# Patient Record
Sex: MALE | Race: WHITE | NOT HISPANIC OR LATINO | URBAN - METROPOLITAN AREA
[De-identification: names, ages, dates, MRNs, and addresses within clinical notes are randomized per-mention and may not be internally consistent; named-entity substitution may affect disease eponyms.]

---

## 2020-02-05 ENCOUNTER — OUTPATIENT (OUTPATIENT)
Dept: OUTPATIENT SERVICES | Facility: HOSPITAL | Age: 70
LOS: 1 days | End: 2020-02-05
Payer: MEDICARE

## 2020-02-05 VITALS
HEART RATE: 68 BPM | TEMPERATURE: 98 F | WEIGHT: 199.08 LBS | OXYGEN SATURATION: 98 % | RESPIRATION RATE: 16 BRPM | DIASTOLIC BLOOD PRESSURE: 87 MMHG | SYSTOLIC BLOOD PRESSURE: 144 MMHG | HEIGHT: 69 IN

## 2020-02-05 DIAGNOSIS — D13.1 BENIGN NEOPLASM OF STOMACH: ICD-10-CM

## 2020-02-05 DIAGNOSIS — Z98.890 OTHER SPECIFIED POSTPROCEDURAL STATES: Chronic | ICD-10-CM

## 2020-02-05 DIAGNOSIS — Z90.81 ACQUIRED ABSENCE OF SPLEEN: Chronic | ICD-10-CM

## 2020-02-05 DIAGNOSIS — I10 ESSENTIAL (PRIMARY) HYPERTENSION: ICD-10-CM

## 2020-02-05 DIAGNOSIS — Z01.818 ENCOUNTER FOR OTHER PREPROCEDURAL EXAMINATION: ICD-10-CM

## 2020-02-05 PROCEDURE — G0463: CPT

## 2020-02-05 RX ORDER — AMLODIPINE BESYLATE 2.5 MG/1
1 TABLET ORAL
Qty: 0 | Refills: 0 | DISCHARGE

## 2020-02-05 NOTE — H&P PST ADULT - HISTORY OF PRESENT ILLNESS
69 y/o male c/o intermittent abd pain x 2 month s/p endoscopy and found to have gastric nodule s/p biopsy: benign. Today he presents to PST for scheduled Endoscopic Ultrasound Anes on 2/7/20. Denies any palpitations, SOB, N/V, fever or chills.    ***Pt states all blood work done at Dr. Mohr office on 1/31/20, office currently closed, will follow up. pt understand that he may  have to came back to PST if unable to obtain lab result***

## 2020-02-05 NOTE — H&P PST ADULT - NEUROLOGICAL DETAILS
no spontaneous movement/normal strength/responds to verbal commands/alert and oriented x 3/sensation intact

## 2020-02-05 NOTE — H&P PST ADULT - NSANTHOSAYNRD_GEN_A_CORE
No. KATHRYN screening performed.  STOP BANG Legend: 0-2 = LOW Risk; 3-4 = INTERMEDIATE Risk; 5-8 = HIGH Risk

## 2020-02-05 NOTE — H&P PST ADULT - NSICDXPASTMEDICALHX_GEN_ALL_CORE_FT
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

## 2020-02-05 NOTE — H&P PST ADULT - LYMPHATIC
anterior cervical R/supraclavicular L/supraclavicular R/posterior cervical R/posterior cervical L/anterior cervical L

## 2020-02-05 NOTE — H&P PST ADULT - GASTROINTESTINAL DETAILS
bowel sounds normal/no distention/soft/no rebound tenderness/no rigidity/normal/no guarding/nontender/no organomegaly

## 2020-02-05 NOTE — H&P PST ADULT - NSICDXPROBLEM_GEN_ALL_CORE_FT
PROBLEM DIAGNOSES  Problem: Benign neoplasm of stomach  Assessment and Plan: Endoscopic Ultrasound Anes on 2/7/20.  Lab work results: pendig   Pre-op education provided - all questions answered     Problem: HTN (hypertension)  Assessment and Plan: continue on antihypertensive medications

## 2020-02-07 ENCOUNTER — RESULT REVIEW (OUTPATIENT)
Age: 70
End: 2020-02-07

## 2020-02-07 ENCOUNTER — OUTPATIENT (OUTPATIENT)
Dept: OUTPATIENT SERVICES | Facility: HOSPITAL | Age: 70
LOS: 1 days | End: 2020-02-07
Payer: MEDICARE

## 2020-02-07 DIAGNOSIS — Z90.81 ACQUIRED ABSENCE OF SPLEEN: Chronic | ICD-10-CM

## 2020-02-07 DIAGNOSIS — Z01.818 ENCOUNTER FOR OTHER PREPROCEDURAL EXAMINATION: ICD-10-CM

## 2020-02-07 DIAGNOSIS — D13.1 BENIGN NEOPLASM OF STOMACH: ICD-10-CM

## 2020-02-07 DIAGNOSIS — Z98.890 OTHER SPECIFIED POSTPROCEDURAL STATES: Chronic | ICD-10-CM

## 2020-02-07 PROCEDURE — 88312 SPECIAL STAINS GROUP 1: CPT

## 2020-02-07 PROCEDURE — 88341 IMHCHEM/IMCYTCHM EA ADD ANTB: CPT

## 2020-02-07 PROCEDURE — 88305 TISSUE EXAM BY PATHOLOGIST: CPT

## 2020-02-07 PROCEDURE — 43239 EGD BIOPSY SINGLE/MULTIPLE: CPT | Mod: XS

## 2020-02-07 PROCEDURE — 43242 EGD US FINE NEEDLE BX/ASPIR: CPT

## 2020-02-07 PROCEDURE — 88173 CYTOPATH EVAL FNA REPORT: CPT | Mod: 26

## 2020-02-07 PROCEDURE — 88173 CYTOPATH EVAL FNA REPORT: CPT

## 2020-02-07 PROCEDURE — 88312 SPECIAL STAINS GROUP 1: CPT | Mod: 26

## 2020-02-07 PROCEDURE — 88342 IMHCHEM/IMCYTCHM 1ST ANTB: CPT | Mod: 26

## 2020-02-07 PROCEDURE — 88341 IMHCHEM/IMCYTCHM EA ADD ANTB: CPT | Mod: 26

## 2020-02-07 PROCEDURE — 88342 IMHCHEM/IMCYTCHM 1ST ANTB: CPT

## 2020-02-07 PROCEDURE — 88172 CYTP DX EVAL FNA 1ST EA SITE: CPT

## 2020-02-07 PROCEDURE — 88305 TISSUE EXAM BY PATHOLOGIST: CPT | Mod: 26

## 2020-02-10 LAB — SURGICAL PATHOLOGY STUDY: SIGNIFICANT CHANGE UP

## 2020-02-14 LAB — NON-GYNECOLOGICAL CYTOLOGY STUDY: SIGNIFICANT CHANGE UP

## 2020-02-27 PROBLEM — I10 ESSENTIAL (PRIMARY) HYPERTENSION: Chronic | Status: ACTIVE | Noted: 2020-02-05

## 2020-02-27 PROBLEM — Z86.2 PERSONAL HISTORY OF DISEASES OF THE BLOOD AND BLOOD-FORMING ORGANS AND CERTAIN DISORDERS INVOLVING THE IMMUNE MECHANISM: Chronic | Status: ACTIVE | Noted: 2020-02-05

## 2020-02-27 PROBLEM — E78.5 HYPERLIPIDEMIA, UNSPECIFIED: Chronic | Status: ACTIVE | Noted: 2020-02-05

## 2020-03-02 PROBLEM — Z00.00 ENCOUNTER FOR PREVENTIVE HEALTH EXAMINATION: Status: ACTIVE | Noted: 2020-03-02

## 2020-03-05 ENCOUNTER — APPOINTMENT (OUTPATIENT)
Dept: SURGICAL ONCOLOGY | Facility: CLINIC | Age: 70
End: 2020-03-05
Payer: MEDICARE

## 2020-03-05 VITALS
DIASTOLIC BLOOD PRESSURE: 62 MMHG | RESPIRATION RATE: 16 BRPM | WEIGHT: 197 LBS | HEART RATE: 64 BPM | SYSTOLIC BLOOD PRESSURE: 142 MMHG | BODY MASS INDEX: 29.18 KG/M2 | OXYGEN SATURATION: 96 % | HEIGHT: 69 IN

## 2020-03-05 DIAGNOSIS — Z86.2 PERSONAL HISTORY OF DISEASES OF THE BLOOD AND BLOOD-FORMING ORGANS AND CERTAIN DISORDERS INVOLVING THE IMMUNE MECHANISM: ICD-10-CM

## 2020-03-05 DIAGNOSIS — Z86.79 PERSONAL HISTORY OF OTHER DISEASES OF THE CIRCULATORY SYSTEM: ICD-10-CM

## 2020-03-05 PROCEDURE — 99204 OFFICE O/P NEW MOD 45 MIN: CPT

## 2020-03-05 RX ORDER — ROSUVASTATIN CALCIUM 5 MG/1
TABLET, FILM COATED ORAL
Refills: 0 | Status: ACTIVE | COMMUNITY

## 2020-03-05 RX ORDER — VALSARTAN 160 MG/1
160 TABLET, COATED ORAL
Refills: 0 | Status: ACTIVE | COMMUNITY

## 2020-03-10 NOTE — HISTORY OF PRESENT ILLNESS
[de-identified] : Raciel is a pleasant 70 year-old male here for an initial consultation.  He began to experience intermittent epigastric pain and on 1/6/20 Dr. Ryan Tripathi performed an EGD which revealed a large nodule in the gastric body.  Biopsy was performed and pathology.\par \par He was then referred for a CT of the abdomen and pelvis on 2/5/20 which demonstrated a 1.9 cm soft tissue attenuation mass along the lesser curvature of the stomach without a significant exophytic component.   An EUS evaluation was performed by Dr. Eric Mohr on 2/7/20 and revealed a subepithelial lesion in the lesser curvature of the stomach.  It was encountered at 5 cm distal to the gastroesophageal junction.  It measured 17.5 mm x 12.3 mm in diameter.  Differentials included carcinoid, GIST or leiomyoma.  FNA was suspicious for neoplasm and favored a GIST. \par \par His past medical history includes hypertension, hyperlipidemia and ITP.  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  He has a family history of breast cancer involving his mother.   He does not smoke or drink alcohol .\par \par He is feeling well otherwise.  He denies any persistent abdominal pain, nausea/vomiting or constitutional symptoms.

## 2020-03-10 NOTE — ASSESSMENT
[FreeTextEntry1] : We discussed the planned for robotic laparoscopic partial gastrectomy, possibly open with intraoperative EGD.We discussed  the associated risks, benefits, and alternatives of the procedure. We also discussed potential complications and postoperative expectations. Raciel expresses his understanding and agrees to proceed.

## 2020-03-16 ENCOUNTER — NON-APPOINTMENT (OUTPATIENT)
Age: 70
End: 2020-03-16

## 2020-03-17 ENCOUNTER — OUTPATIENT (OUTPATIENT)
Dept: OUTPATIENT SERVICES | Facility: HOSPITAL | Age: 70
LOS: 1 days | End: 2020-03-17
Payer: MEDICARE

## 2020-03-17 VITALS
DIASTOLIC BLOOD PRESSURE: 80 MMHG | SYSTOLIC BLOOD PRESSURE: 140 MMHG | OXYGEN SATURATION: 98 % | WEIGHT: 192.02 LBS | HEART RATE: 65 BPM | TEMPERATURE: 97 F | HEIGHT: 68 IN | RESPIRATION RATE: 16 BRPM

## 2020-03-17 DIAGNOSIS — C49.9 MALIGNANT NEOPLASM OF CONNECTIVE AND SOFT TISSUE, UNSPECIFIED: ICD-10-CM

## 2020-03-17 DIAGNOSIS — C80.1 MALIGNANT (PRIMARY) NEOPLASM, UNSPECIFIED: ICD-10-CM

## 2020-03-17 DIAGNOSIS — Z90.81 ACQUIRED ABSENCE OF SPLEEN: Chronic | ICD-10-CM

## 2020-03-17 DIAGNOSIS — I10 ESSENTIAL (PRIMARY) HYPERTENSION: ICD-10-CM

## 2020-03-17 DIAGNOSIS — G47.33 OBSTRUCTIVE SLEEP APNEA (ADULT) (PEDIATRIC): ICD-10-CM

## 2020-03-17 DIAGNOSIS — Z98.890 OTHER SPECIFIED POSTPROCEDURAL STATES: Chronic | ICD-10-CM

## 2020-03-17 LAB
ALBUMIN SERPL ELPH-MCNC: 4.2 G/DL — SIGNIFICANT CHANGE UP (ref 3.3–5)
ALP SERPL-CCNC: 87 U/L — SIGNIFICANT CHANGE UP (ref 40–120)
ALT FLD-CCNC: 18 U/L — SIGNIFICANT CHANGE UP (ref 4–41)
ANION GAP SERPL CALC-SCNC: 10 MMO/L — SIGNIFICANT CHANGE UP (ref 7–14)
AST SERPL-CCNC: 23 U/L — SIGNIFICANT CHANGE UP (ref 4–40)
BILIRUB SERPL-MCNC: 0.4 MG/DL — SIGNIFICANT CHANGE UP (ref 0.2–1.2)
BLD GP AB SCN SERPL QL: NEGATIVE — SIGNIFICANT CHANGE UP
BUN SERPL-MCNC: 19 MG/DL — SIGNIFICANT CHANGE UP (ref 7–23)
CALCIUM SERPL-MCNC: 9.7 MG/DL — SIGNIFICANT CHANGE UP (ref 8.4–10.5)
CHLORIDE SERPL-SCNC: 101 MMOL/L — SIGNIFICANT CHANGE UP (ref 98–107)
CO2 SERPL-SCNC: 26 MMOL/L — SIGNIFICANT CHANGE UP (ref 22–31)
CREAT SERPL-MCNC: 1 MG/DL — SIGNIFICANT CHANGE UP (ref 0.5–1.3)
GLUCOSE SERPL-MCNC: 85 MG/DL — SIGNIFICANT CHANGE UP (ref 70–99)
HCT VFR BLD CALC: 44.9 % — SIGNIFICANT CHANGE UP (ref 39–50)
HGB BLD-MCNC: 15 G/DL — SIGNIFICANT CHANGE UP (ref 13–17)
INR BLD: 1.14 — SIGNIFICANT CHANGE UP (ref 0.88–1.17)
MCHC RBC-ENTMCNC: 30.2 PG — SIGNIFICANT CHANGE UP (ref 27–34)
MCHC RBC-ENTMCNC: 33.4 % — SIGNIFICANT CHANGE UP (ref 32–36)
MCV RBC AUTO: 90.5 FL — SIGNIFICANT CHANGE UP (ref 80–100)
NRBC # FLD: 0 K/UL — SIGNIFICANT CHANGE UP (ref 0–0)
PLATELET # BLD AUTO: 239 K/UL — SIGNIFICANT CHANGE UP (ref 150–400)
PMV BLD: 13.3 FL — HIGH (ref 7–13)
POTASSIUM SERPL-MCNC: 4.9 MMOL/L — SIGNIFICANT CHANGE UP (ref 3.5–5.3)
POTASSIUM SERPL-SCNC: 4.9 MMOL/L — SIGNIFICANT CHANGE UP (ref 3.5–5.3)
PROT SERPL-MCNC: 7.2 G/DL — SIGNIFICANT CHANGE UP (ref 6–8.3)
PROTHROM AB SERPL-ACNC: 13 SEC — SIGNIFICANT CHANGE UP (ref 9.8–13.1)
RBC # BLD: 4.96 M/UL — SIGNIFICANT CHANGE UP (ref 4.2–5.8)
RBC # FLD: 13.9 % — SIGNIFICANT CHANGE UP (ref 10.3–14.5)
RH IG SCN BLD-IMP: POSITIVE — SIGNIFICANT CHANGE UP
SODIUM SERPL-SCNC: 137 MMOL/L — SIGNIFICANT CHANGE UP (ref 135–145)
WBC # BLD: 9.02 K/UL — SIGNIFICANT CHANGE UP (ref 3.8–10.5)
WBC # FLD AUTO: 9.02 K/UL — SIGNIFICANT CHANGE UP (ref 3.8–10.5)

## 2020-03-17 PROCEDURE — 93010 ELECTROCARDIOGRAM REPORT: CPT

## 2020-03-17 RX ORDER — AMLODIPINE BESYLATE 2.5 MG/1
1 TABLET ORAL
Qty: 0 | Refills: 0 | DISCHARGE

## 2020-03-17 RX ORDER — SODIUM CHLORIDE 9 MG/ML
1000 INJECTION, SOLUTION INTRAVENOUS
Refills: 0 | Status: DISCONTINUED | OUTPATIENT
Start: 2020-05-19 | End: 2020-05-19

## 2020-03-17 RX ORDER — VALSARTAN 80 MG/1
1 TABLET ORAL
Qty: 0 | Refills: 0 | DISCHARGE

## 2020-03-17 RX ORDER — ROSUVASTATIN CALCIUM 5 MG/1
1 TABLET ORAL
Qty: 0 | Refills: 0 | DISCHARGE

## 2020-03-17 NOTE — H&P PST ADULT - NEGATIVE NEUROLOGICAL SYMPTOMS
no paresthesias/no syncope/no focal seizures/no transient paralysis/no weakness/no generalized seizures

## 2020-03-17 NOTE — H&P PST ADULT - NSICDXPASTMEDICALHX_GEN_ALL_CORE_FT
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

## 2020-03-17 NOTE — H&P PST ADULT - ATTENDING COMMENTS
D/w pt plan for robotic poss open resection of Gastric GIST, poss EGD    Discussed r/b/a post op expectations poss complications.      Pt understands and agrees to proceed.

## 2020-03-17 NOTE — H&P PST ADULT - PRIMARY CARE PROVIDER
Dr. Reyes  last visit 3 week ago Dr Tripathi Dr Tripathi  Dr Tripathi                       Dr Castano

## 2020-03-17 NOTE — H&P PST ADULT - LYMPHATIC
supraclavicular L/anterior cervical R/posterior cervical L/anterior cervical L/posterior cervical R/supraclavicular R

## 2020-03-17 NOTE — H&P PST ADULT - NSICDXPROBLEM_GEN_ALL_CORE_FT
PROBLEM DIAGNOSES  Problem: Malignant neoplasm  Assessment and Plan: Robotic Laparoscopic Partial Gastrectomy , Upper Endoscopy  Pre op instructions reviewed with pt including Hibiclens with teach back , pt verbalized good understanding of pre op instructions     Problem: Hypertension  Assessment and Plan: Pt to take Valsartan dos    Problem: KATHRYN (obstructive sleep apnea)  Assessment and Plan: +KATHRYN Precautions  OR booking notified via fax PROBLEM DIAGNOSES  Problem: Malignant neoplasm  Assessment and Plan: Robotic Laparoscopic Partial Gastrectomy , Upper Endoscopy  Pre op instructions reviewed with pt including Hibiclens with teach back , pt verbalized good understanding of pre op instructions   Dr Castano to provide pre op medical evaluation    Problem: Hypertension  Assessment and Plan: Pt to take Valsartan dos    Problem: KATHRYN (obstructive sleep apnea)  Assessment and Plan: +KATHRYN Precautions  OR booking notified via fax

## 2020-03-17 NOTE — H&P PST ADULT - GASTROINTESTINAL DETAILS
no rebound tenderness/no rigidity/no guarding/no organomegaly/soft/nontender/no distention/bowel sounds normal/normal

## 2020-03-17 NOTE — H&P PST ADULT - NSICDXFAMILYHX_GEN_ALL_CORE_FT
FAMILY HISTORY:  Family history of CHF (congestive heart failure), father  FHx: diabetes mellitus, mother

## 2020-03-17 NOTE — H&P PST ADULT - HISTORY OF PRESENT ILLNESS
71 y/o male c/o intermittent abd pain x 2 month s/p endoscopy and found to have gastric nodule s/p biopsy: benign. Today he presents to PST for scheduled Endoscopic Ultrasound Anes on 2/7/20. Denies any palpitations, SOB, N/V, fever or chills.    ***Pt states all blood work done at Dr. Mohr office on 1/31/20, office currently closed, will follow up. pt understand that he may  have to came back to PST if unable to obtain lab result*** Pt is a 71 y/o male c/o intermittent abd pain x 2 month s/p endoscopy and found to have gastric nodule s/p biopsy:"  benign."  Pt s/p  Endoscopic Ultrasound Anes on 2/7/20. Pt reports " tumor wall stomach "  Pt to surgeon ; pt now presents for Robotic Laparoscopic partial Gastrectomy, Upper Endoscopy Pt is a 69 y/o male c/o intermittent abd pain x 2 month s/p endoscopy and found to have gastric nodule s/p biopsy:"  benign."  Pt s/p  Endoscopic Ultrasound Anes on 2/7/20. Pt reports " tumor wall stomach "  Pt to surgeon ; pt now presents for Robotic Laparoscopic Partial Gastrectomy, Upper Endoscopy

## 2020-03-17 NOTE — H&P PST ADULT - MUSCULOSKELETAL
negative No joint pain, swelling or deformity; no limitation of movement details… detailed exam ROM intact/normal strength

## 2020-03-17 NOTE — H&P PST ADULT - NSICDXPASTSURGICALHX_GEN_ALL_CORE_FT
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

## 2020-04-01 ENCOUNTER — APPOINTMENT (OUTPATIENT)
Dept: SURGICAL ONCOLOGY | Facility: HOSPITAL | Age: 70
End: 2020-04-01

## 2020-05-01 ENCOUNTER — APPOINTMENT (OUTPATIENT)
Dept: SURGICAL ONCOLOGY | Facility: CLINIC | Age: 70
End: 2020-05-01
Payer: MEDICARE

## 2020-05-01 LAB
ALBUMIN SERPL ELPH-MCNC: 4.3 G/DL
ALP BLD-CCNC: 96 U/L
ALT SERPL-CCNC: 20 U/L
ANION GAP SERPL CALC-SCNC: 12 MMOL/L
AST SERPL-CCNC: 24 U/L
BASOPHILS # BLD AUTO: 0.1 K/UL
BASOPHILS NFR BLD AUTO: 0.8 %
BILIRUB SERPL-MCNC: 0.4 MG/DL
BUN SERPL-MCNC: 18 MG/DL
CALCIUM SERPL-MCNC: 10.2 MG/DL
CHLORIDE SERPL-SCNC: 101 MMOL/L
CO2 SERPL-SCNC: 28 MMOL/L
CREAT SERPL-MCNC: 0.98 MG/DL
EOSINOPHIL # BLD AUTO: 0.32 K/UL
EOSINOPHIL NFR BLD AUTO: 2.7 %
GLUCOSE SERPL-MCNC: 79 MG/DL
HCT VFR BLD CALC: 48.1 %
HGB BLD-MCNC: 15.7 G/DL
IMM GRANULOCYTES NFR BLD AUTO: 0.3 %
LYMPHOCYTES # BLD AUTO: 3.85 K/UL
LYMPHOCYTES NFR BLD AUTO: 32.1 %
MAN DIFF?: NORMAL
MCHC RBC-ENTMCNC: 30.3 PG
MCHC RBC-ENTMCNC: 32.6 GM/DL
MCV RBC AUTO: 92.7 FL
MONOCYTES # BLD AUTO: 1.4 K/UL
MONOCYTES NFR BLD AUTO: 11.7 %
NEUTROPHILS # BLD AUTO: 6.29 K/UL
NEUTROPHILS NFR BLD AUTO: 52.4 %
PLATELET # BLD AUTO: 216 K/UL
POTASSIUM SERPL-SCNC: 5.1 MMOL/L
PROT SERPL-MCNC: 7.8 G/DL
RBC # BLD: 5.19 M/UL
RBC # FLD: 14.2 %
SODIUM SERPL-SCNC: 141 MMOL/L
WBC # FLD AUTO: 11.99 K/UL

## 2020-05-01 PROCEDURE — 99214 OFFICE O/P EST MOD 30 MIN: CPT | Mod: 95

## 2020-05-18 ENCOUNTER — TRANSCRIPTION ENCOUNTER (OUTPATIENT)
Age: 70
End: 2020-05-18

## 2020-05-18 ENCOUNTER — APPOINTMENT (OUTPATIENT)
Dept: DISASTER EMERGENCY | Facility: CLINIC | Age: 70
End: 2020-05-18

## 2020-05-18 NOTE — ASU PATIENT PROFILE, ADULT - PMH
Benign neoplasm of stomach    Fall with injury  1999 s/p multiple rib fractures, spleen injury  History of ITP  2017  single episode  HTN (hypertension)    Hyperlipidemia

## 2020-05-19 ENCOUNTER — INPATIENT (INPATIENT)
Facility: HOSPITAL | Age: 70
LOS: 2 days | Discharge: HOME HEALTH | End: 2020-05-22
Attending: SURGERY | Admitting: SURGERY
Payer: MEDICARE

## 2020-05-19 ENCOUNTER — APPOINTMENT (OUTPATIENT)
Dept: SURGICAL ONCOLOGY | Facility: HOSPITAL | Age: 70
End: 2020-05-19

## 2020-05-19 ENCOUNTER — RESULT REVIEW (OUTPATIENT)
Age: 70
End: 2020-05-19

## 2020-05-19 VITALS
HEART RATE: 78 BPM | WEIGHT: 192.02 LBS | OXYGEN SATURATION: 98 % | TEMPERATURE: 98 F | HEIGHT: 68 IN | RESPIRATION RATE: 16 BRPM | SYSTOLIC BLOOD PRESSURE: 131 MMHG | DIASTOLIC BLOOD PRESSURE: 66 MMHG

## 2020-05-19 DIAGNOSIS — Z98.890 OTHER SPECIFIED POSTPROCEDURAL STATES: Chronic | ICD-10-CM

## 2020-05-19 DIAGNOSIS — Z90.81 ACQUIRED ABSENCE OF SPLEEN: Chronic | ICD-10-CM

## 2020-05-19 DIAGNOSIS — C49.9 MALIGNANT NEOPLASM OF CONNECTIVE AND SOFT TISSUE, UNSPECIFIED: ICD-10-CM

## 2020-05-19 LAB
BLD GP AB SCN SERPL QL: NEGATIVE — SIGNIFICANT CHANGE UP
RH IG SCN BLD-IMP: POSITIVE — SIGNIFICANT CHANGE UP
SARS-COV-2 N GENE NPH QL NAA+PROBE: NOT DETECTED

## 2020-05-19 PROCEDURE — 88360 TUMOR IMMUNOHISTOCHEM/MANUAL: CPT | Mod: 26

## 2020-05-19 PROCEDURE — 88305 TISSUE EXAM BY PATHOLOGIST: CPT | Mod: 26

## 2020-05-19 PROCEDURE — 43611 EXCISION OF STOMACH LESION: CPT

## 2020-05-19 PROCEDURE — 88309 TISSUE EXAM BY PATHOLOGIST: CPT | Mod: 26

## 2020-05-19 PROCEDURE — 43236 UPPR GI SCOPE W/SUBMUC INJ: CPT

## 2020-05-19 PROCEDURE — 43500 SURGICAL OPENING OF STOMACH: CPT

## 2020-05-19 PROCEDURE — 88341 IMHCHEM/IMCYTCHM EA ADD ANTB: CPT | Mod: 26,59

## 2020-05-19 PROCEDURE — 88342 IMHCHEM/IMCYTCHM 1ST ANTB: CPT | Mod: 26,59

## 2020-05-19 RX ORDER — MORPHINE SULFATE 50 MG/1
2 CAPSULE, EXTENDED RELEASE ORAL EVERY 4 HOURS
Refills: 0 | Status: DISCONTINUED | OUTPATIENT
Start: 2020-05-19 | End: 2020-05-22

## 2020-05-19 RX ORDER — ACETAMINOPHEN 500 MG
1000 TABLET ORAL EVERY 6 HOURS
Refills: 0 | Status: COMPLETED | OUTPATIENT
Start: 2020-05-19 | End: 2020-05-20

## 2020-05-19 RX ORDER — SODIUM CHLORIDE 9 MG/ML
1000 INJECTION, SOLUTION INTRAVENOUS
Refills: 0 | Status: DISCONTINUED | OUTPATIENT
Start: 2020-05-19 | End: 2020-05-20

## 2020-05-19 RX ORDER — KETOROLAC TROMETHAMINE 30 MG/ML
15 SYRINGE (ML) INJECTION EVERY 6 HOURS
Refills: 0 | Status: DISCONTINUED | OUTPATIENT
Start: 2020-05-19 | End: 2020-05-20

## 2020-05-19 RX ORDER — ONDANSETRON 8 MG/1
4 TABLET, FILM COATED ORAL ONCE
Refills: 0 | Status: DISCONTINUED | OUTPATIENT
Start: 2020-05-19 | End: 2020-05-19

## 2020-05-19 RX ORDER — TOBRAMYCIN 0.3 %
1 DROPS OPHTHALMIC (EYE) EVERY 4 HOURS
Refills: 0 | Status: COMPLETED | OUTPATIENT
Start: 2020-05-19 | End: 2020-05-20

## 2020-05-19 RX ORDER — PANTOPRAZOLE SODIUM 20 MG/1
40 TABLET, DELAYED RELEASE ORAL
Refills: 0 | Status: DISCONTINUED | OUTPATIENT
Start: 2020-05-19 | End: 2020-05-21

## 2020-05-19 RX ORDER — FENTANYL CITRATE 50 UG/ML
25 INJECTION INTRAVENOUS
Refills: 0 | Status: DISCONTINUED | OUTPATIENT
Start: 2020-05-19 | End: 2020-05-19

## 2020-05-19 RX ORDER — ENOXAPARIN SODIUM 100 MG/ML
40 INJECTION SUBCUTANEOUS DAILY
Refills: 0 | Status: DISCONTINUED | OUTPATIENT
Start: 2020-05-19 | End: 2020-05-22

## 2020-05-19 RX ORDER — HYDROMORPHONE HYDROCHLORIDE 2 MG/ML
0.25 INJECTION INTRAMUSCULAR; INTRAVENOUS; SUBCUTANEOUS
Refills: 0 | Status: DISCONTINUED | OUTPATIENT
Start: 2020-05-19 | End: 2020-05-19

## 2020-05-19 RX ADMIN — HYDROMORPHONE HYDROCHLORIDE 0.25 MILLIGRAM(S): 2 INJECTION INTRAMUSCULAR; INTRAVENOUS; SUBCUTANEOUS at 20:40

## 2020-05-19 RX ADMIN — Medication 1 DROP(S): at 20:34

## 2020-05-19 RX ADMIN — Medication 1 DROP(S): at 21:08

## 2020-05-19 RX ADMIN — Medication 15 MILLIGRAM(S): at 23:11

## 2020-05-19 RX ADMIN — SODIUM CHLORIDE 100 MILLILITER(S): 9 INJECTION, SOLUTION INTRAVENOUS at 20:15

## 2020-05-19 RX ADMIN — Medication 400 MILLIGRAM(S): at 23:11

## 2020-05-19 NOTE — BRIEF OPERATIVE NOTE - NSICDXBRIEFPROCEDURE_GEN_ALL_CORE_FT
PROCEDURES:  Laparoscopic partial gastrectomy 19-May-2020 19:50:44 converted to open due to adhesions Parvin Alanis

## 2020-05-19 NOTE — BRIEF OPERATIVE NOTE - OPERATION/FINDINGS
Dense adhesions, unable to be lysed laparoscopically, converted to open for resection of mass at lesser curvature, near GE junction. Mass removed and gastrotomy repaired with black load staplers.

## 2020-05-19 NOTE — CHART NOTE - NSCHARTNOTEFT_GEN_A_CORE
POST-OPERATIVE NOTE    Subjective:  Patient is s/p Laparoscopic partial gastrectomy. Denies nausea, vomiting, chest pain, sob, fevers chills. Pain is well controlled. Voiding adequately through santa    Vital Signs Last 24 Hrs  T(C): 37.2 (19 May 2020 22:19), Max: 37.2 (19 May 2020 22:19)  T(F): 98.9 (19 May 2020 22:19), Max: 98.9 (19 May 2020 22:19)  HR: 78 (19 May 2020 22:19) (71 - 78)  BP: 142/86 (19 May 2020 22:19) (119/87 - 148/72)  BP(mean): 91 (19 May 2020 21:45) (77 - 93)  RR: 19 (19 May 2020 22:19) (12 - 23)  SpO2: 97% (19 May 2020 22:19) (93% - 99%)  I&O's Detail    19 May 2020 07:01  -  19 May 2020 23:26  --------------------------------------------------------  IN:    lactated ringers.: 100 mL  Total IN: 100 mL    OUT:    Indwelling Catheter - Urethral: 150 mL  Total OUT: 150 mL    Total NET: -50 mL          Physical Exam:  General: NAD, resting comfortably in bed  Pulmonary: Nonlabored breathing, no respiratory distress  Cardiovascular: NSR  Abdominal: soft, appropriately tender and dressings with minimal strikethrough and intact      LABS:                Assessment:  The patient is a 70y Male who is 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 patient recovering appropriately on the floor.    Plan:  - Pain control as needed  - DVT ppx  - OOB and ambulating as tolerated  - F/u AM labs    D team surgery  [id number]

## 2020-05-20 LAB
ANION GAP SERPL CALC-SCNC: 13 MMO/L — SIGNIFICANT CHANGE UP (ref 7–14)
BASOPHILS # BLD AUTO: 0.05 K/UL — SIGNIFICANT CHANGE UP (ref 0–0.2)
BASOPHILS NFR BLD AUTO: 0.3 % — SIGNIFICANT CHANGE UP (ref 0–2)
BUN SERPL-MCNC: 17 MG/DL — SIGNIFICANT CHANGE UP (ref 7–23)
CALCIUM SERPL-MCNC: 9.2 MG/DL — SIGNIFICANT CHANGE UP (ref 8.4–10.5)
CHLORIDE SERPL-SCNC: 103 MMOL/L — SIGNIFICANT CHANGE UP (ref 98–107)
CO2 SERPL-SCNC: 21 MMOL/L — LOW (ref 22–31)
CREAT SERPL-MCNC: 0.96 MG/DL — SIGNIFICANT CHANGE UP (ref 0.5–1.3)
EOSINOPHIL # BLD AUTO: 0 K/UL — SIGNIFICANT CHANGE UP (ref 0–0.5)
EOSINOPHIL NFR BLD AUTO: 0 % — SIGNIFICANT CHANGE UP (ref 0–6)
GLUCOSE SERPL-MCNC: 123 MG/DL — HIGH (ref 70–99)
HCT VFR BLD CALC: 41.2 % — SIGNIFICANT CHANGE UP (ref 39–50)
HGB BLD-MCNC: 13.6 G/DL — SIGNIFICANT CHANGE UP (ref 13–17)
IMM GRANULOCYTES NFR BLD AUTO: 0.5 % — SIGNIFICANT CHANGE UP (ref 0–1.5)
LYMPHOCYTES # BLD AUTO: 1.61 K/UL — SIGNIFICANT CHANGE UP (ref 1–3.3)
LYMPHOCYTES # BLD AUTO: 9.3 % — LOW (ref 13–44)
MAGNESIUM SERPL-MCNC: 1.9 MG/DL — SIGNIFICANT CHANGE UP (ref 1.6–2.6)
MCHC RBC-ENTMCNC: 29.8 PG — SIGNIFICANT CHANGE UP (ref 27–34)
MCHC RBC-ENTMCNC: 33 % — SIGNIFICANT CHANGE UP (ref 32–36)
MCV RBC AUTO: 90.2 FL — SIGNIFICANT CHANGE UP (ref 80–100)
MONOCYTES # BLD AUTO: 2.09 K/UL — HIGH (ref 0–0.9)
MONOCYTES NFR BLD AUTO: 12 % — SIGNIFICANT CHANGE UP (ref 2–14)
NEUTROPHILS # BLD AUTO: 13.57 K/UL — HIGH (ref 1.8–7.4)
NEUTROPHILS NFR BLD AUTO: 77.9 % — HIGH (ref 43–77)
NRBC # FLD: 0 K/UL — SIGNIFICANT CHANGE UP (ref 0–0)
PHOSPHATE SERPL-MCNC: 3.8 MG/DL — SIGNIFICANT CHANGE UP (ref 2.5–4.5)
PLATELET # BLD AUTO: 186 K/UL — SIGNIFICANT CHANGE UP (ref 150–400)
PMV BLD: 12.1 FL — SIGNIFICANT CHANGE UP (ref 7–13)
POTASSIUM SERPL-MCNC: 4.6 MMOL/L — SIGNIFICANT CHANGE UP (ref 3.5–5.3)
POTASSIUM SERPL-SCNC: 4.6 MMOL/L — SIGNIFICANT CHANGE UP (ref 3.5–5.3)
RBC # BLD: 4.57 M/UL — SIGNIFICANT CHANGE UP (ref 4.2–5.8)
RBC # FLD: 14.3 % — SIGNIFICANT CHANGE UP (ref 10.3–14.5)
SODIUM SERPL-SCNC: 137 MMOL/L — SIGNIFICANT CHANGE UP (ref 135–145)
WBC # BLD: 17.4 K/UL — HIGH (ref 3.8–10.5)
WBC # FLD AUTO: 17.4 K/UL — HIGH (ref 3.8–10.5)

## 2020-05-20 PROCEDURE — 99222 1ST HOSP IP/OBS MODERATE 55: CPT

## 2020-05-20 RX ORDER — MAGNESIUM SULFATE 500 MG/ML
2 VIAL (ML) INJECTION ONCE
Refills: 0 | Status: COMPLETED | OUTPATIENT
Start: 2020-05-20 | End: 2020-05-20

## 2020-05-20 RX ORDER — KETOROLAC TROMETHAMINE 30 MG/ML
15 SYRINGE (ML) INJECTION EVERY 6 HOURS
Refills: 0 | Status: DISCONTINUED | OUTPATIENT
Start: 2020-05-20 | End: 2020-05-21

## 2020-05-20 RX ORDER — SALIVA SUBSTITUTE COMB NO.11 351 MG
5 POWDER IN PACKET (EA) MUCOUS MEMBRANE
Refills: 0 | Status: DISCONTINUED | OUTPATIENT
Start: 2020-05-20 | End: 2020-05-22

## 2020-05-20 RX ORDER — ACETAMINOPHEN 500 MG
1000 TABLET ORAL EVERY 6 HOURS
Refills: 0 | Status: COMPLETED | OUTPATIENT
Start: 2020-05-20 | End: 2020-05-21

## 2020-05-20 RX ORDER — DEXTROSE MONOHYDRATE, SODIUM CHLORIDE, AND POTASSIUM CHLORIDE 50; .745; 4.5 G/1000ML; G/1000ML; G/1000ML
1000 INJECTION, SOLUTION INTRAVENOUS
Refills: 0 | Status: DISCONTINUED | OUTPATIENT
Start: 2020-05-20 | End: 2020-05-21

## 2020-05-20 RX ADMIN — Medication 15 MILLIGRAM(S): at 12:19

## 2020-05-20 RX ADMIN — ENOXAPARIN SODIUM 40 MILLIGRAM(S): 100 INJECTION SUBCUTANEOUS at 12:19

## 2020-05-20 RX ADMIN — Medication 1 DROP(S): at 16:09

## 2020-05-20 RX ADMIN — Medication 400 MILLIGRAM(S): at 05:10

## 2020-05-20 RX ADMIN — Medication 1 DROP(S): at 05:12

## 2020-05-20 RX ADMIN — Medication 1 DROP(S): at 18:15

## 2020-05-20 RX ADMIN — DEXTROSE MONOHYDRATE, SODIUM CHLORIDE, AND POTASSIUM CHLORIDE 100 MILLILITER(S): 50; .745; 4.5 INJECTION, SOLUTION INTRAVENOUS at 18:15

## 2020-05-20 RX ADMIN — PANTOPRAZOLE SODIUM 40 MILLIGRAM(S): 20 TABLET, DELAYED RELEASE ORAL at 18:15

## 2020-05-20 RX ADMIN — Medication 1 DROP(S): at 20:19

## 2020-05-20 RX ADMIN — Medication 400 MILLIGRAM(S): at 22:51

## 2020-05-20 RX ADMIN — Medication 400 MILLIGRAM(S): at 18:14

## 2020-05-20 RX ADMIN — Medication 1 DROP(S): at 22:52

## 2020-05-20 RX ADMIN — Medication 15 MILLIGRAM(S): at 05:10

## 2020-05-20 RX ADMIN — Medication 1 DROP(S): at 02:33

## 2020-05-20 RX ADMIN — Medication 50 GRAM(S): at 12:18

## 2020-05-20 RX ADMIN — PANTOPRAZOLE SODIUM 40 MILLIGRAM(S): 20 TABLET, DELAYED RELEASE ORAL at 05:11

## 2020-05-20 RX ADMIN — Medication 5 MILLILITER(S): at 20:21

## 2020-05-20 RX ADMIN — Medication 15 MILLIGRAM(S): at 18:15

## 2020-05-20 RX ADMIN — Medication 400 MILLIGRAM(S): at 13:33

## 2020-05-20 RX ADMIN — Medication 15 MILLIGRAM(S): at 22:51

## 2020-05-20 NOTE — PROGRESS NOTE ADULT - ASSESSMENT
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 patient recovering appropriately on the floor.    Plan:  - Pain control as needed  - remove santa this am  - DVT ppx with lovenox  - OOB and ambulating as tolerate; PT consult  - await return of GI function    D team surgery  [id number].

## 2020-05-20 NOTE — CONSULT NOTE ADULT - ATTENDING COMMENTS
I have interviewed and examined the patient and reviewed the residents note including the history, exam, assessment, and plan.  I agree with the residents assessment and plan.    69 y/o M POD # 1 s/p laparoscopic partial gastrectomy with 1 day of right eye irritation relieved with proparacaine. No FB identified on exam. Possibly 2/2 conjunctivochalsis vs. small SPK from exposure after surgery.    Plan:  - artificial tears Q2 hours while awake to right eye  - Erythromycin QHS to right eye  - follow up with own ophthalmologist after discharge for slit lamp exam  - findings and plan discussed with pt and primary team    Follow-Up:  Patient should follow up his/her ophthalmologist or in the [facility] within 1 week of discharge, sooner if symptoms worsen or change.    Mahnaz Sin MD

## 2020-05-20 NOTE — CONSULT NOTE ADULT - SUBJECTIVE AND OBJECTIVE BOX
[facility] Ophthalmology Consult Note    HPI: 71 y/o M, PMH HTN, s/p Lap partial gastectomy POD # 1, c/o R eye irritation and FBS worsened after surgery. 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 He irrigated the area and the irritation gradually resolved. He reports after he woke up from surgery he had the same irritation of his right eye, relieved with proparacaine. Denies photophobia, changes in vision, trauma, eye pain.      PMH: HTN, gastric nodule resection  Meds: see sunrise  POcHx (including surgeries/lasers/trauma):  wears glasses, has an ophthalmologist   Drops: None  FamHx: None  Social Hx: None  Allergies: NKDA    ROS:  General (neg), Vision (per HPI), Head and Neck (neg), Pulm (neg), CV (neg), GI (neg),  (neg), Musculoskeletal (neg), Skin/Integ (neg), Neuro (neg), Endocrine (neg), Heme (neg), All/Immuno (neg)    Mood and Affect Appropriate ( x ),  Oriented to Time, Place, and Person x 3 ( x )    Ophthalmology Exam    Visual acuity (cc): 20/30 OD, 20/20 OS  Pupils: PERRL OU, no APD  Ttono: STP OU  Extraocular movements (EOMs): Full OU, no pain, no diplopia       Pen Light Exam (PLE)  External:  Flat OU  Lids/Lashes/Lacrimal Ducts: redundant conjunctiva noted inferotemporally OD, no follicles/papillae seen. R upper lid everted and examined, no foreign body identified.   Sclera/Conjunctiva:  W+Q OU  Cornea: Cl OU  Anterior Chamber: D+F OU  Iris:  Flat OU  Lens:  Cl OU      Diagnostic Testing: None      Assessment: 71 y/o M POD # 1 s/p laparoscopic partial gastrectomy with 1 day of right eye irritation relieved with proparacaine. No FB identified on exam. Possibly 2/2 conjunctivochalsis vs. small SPK      Plan:  - artificial tears Q2 hours while awake to right eye  - Lacrilube ointment QHS to right eye  - follow up with own ophthalmologist after discharge for slit lamp exam      Follow-Up:  Patient should follow up his/her ophthalmologist or in the [facility] Practice within 1 week of discharge.  [address] [place] [address]  [phone]    S/D/W Dr Sin (attending) [facility] Ophthalmology Consult Note    HPI: 71 y/o M, PMH HTN, s/p Lap partial gastectomy POD # 1, c/o R eye irritation and FBS worsened after surgery. 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 He irrigated the area and the irritation gradually resolved. He reports after he woke up from surgery he had the same irritation of his right eye, relieved with proparacaine. Denies photophobia, changes in vision, trauma, eye pain.      PMH: HTN, gastric nodule resection  Meds: see sunrise  POcHx (including surgeries/lasers/trauma):  wears glasses, has an ophthalmologist   Drops: None  FamHx: None, no glaucoma  Social Hx: None, no etoh, no smoker  Allergies: NKDA    ROS:  General (neg), Vision (per HPI), Head and Neck (neg), Pulm (neg), CV (neg), GI (neg),  (neg), Musculoskeletal (neg), Skin/Integ (neg), Neuro (neg), Endocrine (neg), Heme (neg), All/Immuno (neg)    Mood and Affect Appropriate ( x ),  Oriented to Time, Place, and Person x 3 ( x )    Ophthalmology Exam    Visual acuity (cc): 20/30 OD, 20/20 OS  Pupils: PERRL OU, no APD  Ttono: STP OU  Extraocular movements (EOMs): Full OU, no pain, no diplopia       Pen Light Exam (PLE)  External:  Flat OU  Lids/Lashes/Lacrimal Ducts: redundant conjunctiva noted inferotemporally OD, no follicles/papillae seen. R upper lid everted and examined, no foreign body identified.   Sclera/Conjunctiva:  W+Q OU  Cornea: Cl OU  Anterior Chamber: D+F OU  Iris:  Flat OU  Lens:  Cl OU      Diagnostic Testing: None      Assessment: 71 y/o M POD # 1 s/p laparoscopic partial gastrectomy with 1 day of right eye irritation relieved with proparacaine. No FB identified on exam. Possibly 2/2 conjunctivochalsis vs. small SPK from exposure after surgery.      Plan:  - artificial tears Q2 hours while awake to right eye  - Erythromycin QHS to right eye  - follow up with own ophthalmologist after discharge for slit lamp exam  - findings and plan discussed with pt and primary team      Follow-Up:  Patient should follow up his/her ophthalmologist or in the [facility] Ophthalmology Practice within 1 week of discharge.  [address] [place] [address] [phone]    S/D/W Dr Sin (attending)

## 2020-05-20 NOTE — PROGRESS NOTE ADULT - SUBJECTIVE AND OBJECTIVE BOX
GENERAL SURGERY DAILY PROGRESS NOTE:    Subjective:  Patient reports pain is well controlled. Denies N/V. -flatus/-BM.     Vital Signs Last 24 Hrs  T(C): 37.2 (19 May 2020 22:19), Max: 37.2 (19 May 2020 22:19)  T(F): 98.9 (19 May 2020 22:19), Max: 98.9 (19 May 2020 22:19)  HR: 78 (19 May 2020 22:19) (71 - 78)  BP: 142/86 (19 May 2020 22:19) (119/87 - 148/72)  BP(mean): 91 (19 May 2020 21:45) (77 - 93)  RR: 19 (19 May 2020 22:19) (12 - 23)  SpO2: 97% (19 May 2020 22:19) (93% - 99%)    Exam:  Gen: NAD, resting in bed, alert and responding appropriately  Resp: Airway patent, non-labored respirations  Abdominal: soft, appropriately tender and dressings with minimal strikethrough and intact

## 2020-05-21 LAB
ANION GAP SERPL CALC-SCNC: 10 MMO/L — SIGNIFICANT CHANGE UP (ref 7–14)
BUN SERPL-MCNC: 14 MG/DL — SIGNIFICANT CHANGE UP (ref 7–23)
CALCIUM SERPL-MCNC: 8.6 MG/DL — SIGNIFICANT CHANGE UP (ref 8.4–10.5)
CHLORIDE SERPL-SCNC: 106 MMOL/L — SIGNIFICANT CHANGE UP (ref 98–107)
CO2 SERPL-SCNC: 25 MMOL/L — SIGNIFICANT CHANGE UP (ref 22–31)
CREAT SERPL-MCNC: 0.97 MG/DL — SIGNIFICANT CHANGE UP (ref 0.5–1.3)
GLUCOSE SERPL-MCNC: 102 MG/DL — HIGH (ref 70–99)
HCT VFR BLD CALC: 38.2 % — LOW (ref 39–50)
HGB BLD-MCNC: 12.6 G/DL — LOW (ref 13–17)
MAGNESIUM SERPL-MCNC: 2.2 MG/DL — SIGNIFICANT CHANGE UP (ref 1.6–2.6)
MCHC RBC-ENTMCNC: 29.6 PG — SIGNIFICANT CHANGE UP (ref 27–34)
MCHC RBC-ENTMCNC: 33 % — SIGNIFICANT CHANGE UP (ref 32–36)
MCV RBC AUTO: 89.9 FL — SIGNIFICANT CHANGE UP (ref 80–100)
NRBC # FLD: 0 K/UL — SIGNIFICANT CHANGE UP (ref 0–0)
PHOSPHATE SERPL-MCNC: 2.1 MG/DL — LOW (ref 2.5–4.5)
PLATELET # BLD AUTO: 176 K/UL — SIGNIFICANT CHANGE UP (ref 150–400)
PMV BLD: 12.8 FL — SIGNIFICANT CHANGE UP (ref 7–13)
POTASSIUM SERPL-MCNC: 4.5 MMOL/L — SIGNIFICANT CHANGE UP (ref 3.5–5.3)
POTASSIUM SERPL-SCNC: 4.5 MMOL/L — SIGNIFICANT CHANGE UP (ref 3.5–5.3)
RBC # BLD: 4.25 M/UL — SIGNIFICANT CHANGE UP (ref 4.2–5.8)
RBC # FLD: 14.6 % — HIGH (ref 10.3–14.5)
SODIUM SERPL-SCNC: 141 MMOL/L — SIGNIFICANT CHANGE UP (ref 135–145)
WBC # BLD: 13.95 K/UL — HIGH (ref 3.8–10.5)
WBC # FLD AUTO: 13.95 K/UL — HIGH (ref 3.8–10.5)

## 2020-05-21 RX ORDER — ACETAMINOPHEN 500 MG
1000 TABLET ORAL EVERY 6 HOURS
Refills: 0 | Status: DISCONTINUED | OUTPATIENT
Start: 2020-05-21 | End: 2020-05-22

## 2020-05-21 RX ORDER — PANTOPRAZOLE SODIUM 20 MG/1
40 TABLET, DELAYED RELEASE ORAL
Refills: 0 | Status: DISCONTINUED | OUTPATIENT
Start: 2020-05-21 | End: 2020-05-22

## 2020-05-21 RX ADMIN — Medication 1 DROP(S): at 14:00

## 2020-05-21 RX ADMIN — Medication 15 MILLIGRAM(S): at 05:23

## 2020-05-21 RX ADMIN — Medication 1 DROP(S): at 10:03

## 2020-05-21 RX ADMIN — Medication 5 MILLILITER(S): at 05:24

## 2020-05-21 RX ADMIN — Medication 400 MILLIGRAM(S): at 05:23

## 2020-05-21 RX ADMIN — Medication 400 MILLIGRAM(S): at 11:54

## 2020-05-21 RX ADMIN — Medication 1 DROP(S): at 10:15

## 2020-05-21 RX ADMIN — Medication 1 DROP(S): at 16:00

## 2020-05-21 RX ADMIN — PANTOPRAZOLE SODIUM 40 MILLIGRAM(S): 20 TABLET, DELAYED RELEASE ORAL at 17:13

## 2020-05-21 RX ADMIN — Medication 1 DROP(S): at 22:34

## 2020-05-21 RX ADMIN — Medication 1 DROP(S): at 11:47

## 2020-05-21 RX ADMIN — Medication 63.75 MILLIMOLE(S): at 15:24

## 2020-05-21 RX ADMIN — Medication 1 DROP(S): at 05:23

## 2020-05-21 RX ADMIN — PANTOPRAZOLE SODIUM 40 MILLIGRAM(S): 20 TABLET, DELAYED RELEASE ORAL at 05:23

## 2020-05-21 RX ADMIN — Medication 1 APPLICATION(S): at 12:30

## 2020-05-21 RX ADMIN — Medication 400 MILLIGRAM(S): at 17:07

## 2020-05-21 RX ADMIN — Medication 15 MILLIGRAM(S): at 11:54

## 2020-05-21 RX ADMIN — Medication 1 DROP(S): at 21:29

## 2020-05-21 RX ADMIN — Medication 1 DROP(S): at 17:07

## 2020-05-21 RX ADMIN — ENOXAPARIN SODIUM 40 MILLIGRAM(S): 100 INJECTION SUBCUTANEOUS at 11:54

## 2020-05-21 NOTE — PROGRESS NOTE ADULT - SUBJECTIVE AND OBJECTIVE BOX
Surgery Progress Note  Patient is a 70y old  Male who presents with a chief complaint of "I have a tumor in the wall of my stomach  " (17 Mar 2020 07:21)      SUBJECTIVE: Patient seen and examined at bedside with surgical team, patient without complaints.   Patient tolerating sips. Passing gas no BMs   Pain controlled with medications     Vital Signs Last 24 Hrs  T(C): 36.6 (22 May 2020 00:18), Max: 37.2 (21 May 2020 05:21)  T(F): 97.8 (22 May 2020 00:18), Max: 98.9 (21 May 2020 05:21)  HR: 55 (22 May 2020 00:18) (52 - 60)  BP: 135/65 (22 May 2020 00:18) (125/57 - 147/66)  BP(mean): --  RR: 18 (22 May 2020 00:18) (17 - 20)  SpO2: 95% (22 May 2020 00:18) (95% - 98%)    Physical Exam  Constitutional: NAD  Neuro: awake, alert   Respiratory: breathing comfortably on RA  Abd: soft, non-tender, non-distended, steri-strips c/d/i    Ext: moving all four ext spontaneously     I&O's Detail    20 May 2020 07:01  -  21 May 2020 07:00  --------------------------------------------------------  IN:    dextrose 5% + sodium chloride 0.45% with potassium chloride 20 mEq/L: 1300 mL    IV PiggyBack: 300 mL    lactated ringers.: 1000 mL    Oral Fluid: 780 mL  Total IN: 3380 mL    OUT:    Indwelling Catheter - Urethral: 400 mL    Voided: 2050 mL  Total OUT: 2450 mL    Total NET: 930 mL      21 May 2020 07:01  -  22 May 2020 04:41  --------------------------------------------------------  IN:    Oral Fluid: 240 mL  Total IN: 240 mL    OUT:    Voided: 700 mL  Total OUT: 700 mL    Total NET: -460 mL      MEDICATIONS  (STANDING):  acetaminophen   Tablet .. 1000 milliGRAM(s) Oral every 6 hours  artificial  tears Solution 1 Drop(s) Right EYE every 2 hours  enoxaparin Injectable 40 milliGRAM(s) SubCutaneous daily  pantoprazole    Tablet 40 milliGRAM(s) Oral two times a day  petrolatum Ophthalmic Ointment 1 Application(s) Right EYE daily    MEDICATIONS  (PRN):  Biotene Dry Mouth Oral Rinse 5 milliLiter(s) Swish and Spit two times a day PRN Mouth Care  morphine  - Injectable 2 milliGRAM(s) IV Push every 4 hours PRN Severe Pain (7 - 10)      LABS:                        12.6   13.95 )-----------( 176      ( 21 May 2020 05:35 )             38.2     05-21    141  |  106  |  14  ----------------------------<  102<H>  4.5   |  25  |  0.97    Ca    8.6      21 May 2020 05:35  Phos  2.1     05-21  Mg     2.2     05-21

## 2020-05-21 NOTE — PROGRESS NOTE ADULT - ASSESSMENT
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 patient recovering appropriately on the floor.    Plan:  - Pain control as needed  - santa removed patient passed TOV   - DVT ppx with lovenox  - OOB and ambulating as tolerate  - advance to CLD     D team surgery  [id number].

## 2020-05-22 ENCOUNTER — TRANSCRIPTION ENCOUNTER (OUTPATIENT)
Age: 70
End: 2020-05-22

## 2020-05-22 VITALS
HEART RATE: 65 BPM | DIASTOLIC BLOOD PRESSURE: 78 MMHG | RESPIRATION RATE: 18 BRPM | OXYGEN SATURATION: 97 % | SYSTOLIC BLOOD PRESSURE: 146 MMHG | TEMPERATURE: 99 F

## 2020-05-22 LAB
ANION GAP SERPL CALC-SCNC: 11 MMO/L — SIGNIFICANT CHANGE UP (ref 7–14)
BUN SERPL-MCNC: 17 MG/DL — SIGNIFICANT CHANGE UP (ref 7–23)
CALCIUM SERPL-MCNC: 8.7 MG/DL — SIGNIFICANT CHANGE UP (ref 8.4–10.5)
CHLORIDE SERPL-SCNC: 103 MMOL/L — SIGNIFICANT CHANGE UP (ref 98–107)
CO2 SERPL-SCNC: 24 MMOL/L — SIGNIFICANT CHANGE UP (ref 22–31)
CREAT SERPL-MCNC: 0.9 MG/DL — SIGNIFICANT CHANGE UP (ref 0.5–1.3)
GLUCOSE SERPL-MCNC: 85 MG/DL — SIGNIFICANT CHANGE UP (ref 70–99)
HCT VFR BLD CALC: 38.8 % — LOW (ref 39–50)
HGB BLD-MCNC: 12.5 G/DL — LOW (ref 13–17)
MAGNESIUM SERPL-MCNC: 2 MG/DL — SIGNIFICANT CHANGE UP (ref 1.6–2.6)
MCHC RBC-ENTMCNC: 29.3 PG — SIGNIFICANT CHANGE UP (ref 27–34)
MCHC RBC-ENTMCNC: 32.2 % — SIGNIFICANT CHANGE UP (ref 32–36)
MCV RBC AUTO: 90.9 FL — SIGNIFICANT CHANGE UP (ref 80–100)
NRBC # FLD: 0 K/UL — SIGNIFICANT CHANGE UP (ref 0–0)
PHOSPHATE SERPL-MCNC: 2.1 MG/DL — LOW (ref 2.5–4.5)
PLATELET # BLD AUTO: 144 K/UL — LOW (ref 150–400)
PMV BLD: 13.7 FL — HIGH (ref 7–13)
POTASSIUM SERPL-MCNC: 4.5 MMOL/L — SIGNIFICANT CHANGE UP (ref 3.5–5.3)
POTASSIUM SERPL-SCNC: 4.5 MMOL/L — SIGNIFICANT CHANGE UP (ref 3.5–5.3)
RBC # BLD: 4.27 M/UL — SIGNIFICANT CHANGE UP (ref 4.2–5.8)
RBC # FLD: 14.8 % — HIGH (ref 10.3–14.5)
SODIUM SERPL-SCNC: 138 MMOL/L — SIGNIFICANT CHANGE UP (ref 135–145)
SURGICAL PATHOLOGY STUDY: SIGNIFICANT CHANGE UP
WBC # BLD: 13.02 K/UL — HIGH (ref 3.8–10.5)
WBC # FLD AUTO: 13.02 K/UL — HIGH (ref 3.8–10.5)

## 2020-05-22 RX ORDER — ENOXAPARIN SODIUM 100 MG/ML
40 INJECTION SUBCUTANEOUS
Qty: 1120 | Refills: 0
Start: 2020-05-22 | End: 2020-06-18

## 2020-05-22 RX ORDER — CEPHALEXIN 500 MG
1 CAPSULE ORAL
Qty: 20 | Refills: 0
Start: 2020-05-22 | End: 2020-05-26

## 2020-05-22 RX ADMIN — Medication 1 APPLICATION(S): at 11:19

## 2020-05-22 RX ADMIN — Medication 1 DROP(S): at 09:31

## 2020-05-22 RX ADMIN — Medication 1 DROP(S): at 05:38

## 2020-05-22 RX ADMIN — Medication 63.75 MILLIMOLE(S): at 09:30

## 2020-05-22 RX ADMIN — PANTOPRAZOLE SODIUM 40 MILLIGRAM(S): 20 TABLET, DELAYED RELEASE ORAL at 05:41

## 2020-05-22 RX ADMIN — Medication 1 DROP(S): at 10:00

## 2020-05-22 RX ADMIN — Medication 1 DROP(S): at 11:19

## 2020-05-22 RX ADMIN — ENOXAPARIN SODIUM 40 MILLIGRAM(S): 100 INJECTION SUBCUTANEOUS at 11:23

## 2020-05-22 NOTE — DISCHARGE NOTE PROVIDER - NSDCFUADDINST_GEN_ALL_CORE_FT
You may take 650-1000 mg of tylenol every 4-6 hours. Do not exceed 4 grams of tylenol daily.    You have bandages overlying your incisions called steri strips. Do not remove the steri strips. They will fall off on their own and if not, they will be removed in the office. When showering, avoid rubbing soap into the steri strips and pat dry afterwards. After surgery, some blood may escape from under the tape, which is normal.     Nursing staff will teach how to administer Lovenox, which is a blood thinner, that will be required to inject daily for 28days.

## 2020-05-22 NOTE — PROGRESS NOTE ADULT - SUBJECTIVE AND OBJECTIVE BOX
Surgery Progress Note  Patient is a 70y old  Male who presents with a chief complaint of "I have a tumor in the wall of my stomach  " (17 Mar 2020 07:21)      SUBJECTIVE: Patient seen and examined at bedside with surgical team, patient without complaints.   Tolerating regular diet. IVF discontinued. Getting OOB. Passing gas no BMs     Vital Signs Last 24 Hrs  T(C): 36.6 (22 May 2020 00:18), Max: 37.2 (21 May 2020 05:21)  T(F): 97.8 (22 May 2020 00:18), Max: 98.9 (21 May 2020 05:21)  HR: 55 (22 May 2020 00:18) (52 - 60)  BP: 135/65 (22 May 2020 00:18) (125/57 - 147/66)  BP(mean): --  RR: 18 (22 May 2020 00:18) (17 - 20)  SpO2: 95% (22 May 2020 00:18) (95% - 98%)    Physical Exam  Constitutional: NAD  Neuro: awake, alert   Respiratory: breathing comfortably on RA  Abd: soft, appropriately tender, non-distended , Steri-strips in place and are c/d/i  Ext: moving all four ext spontaneously     I&O's Detail    20 May 2020 07:01  -  21 May 2020 07:00  --------------------------------------------------------  IN:    dextrose 5% + sodium chloride 0.45% with potassium chloride 20 mEq/L: 1300 mL    IV PiggyBack: 300 mL    lactated ringers.: 1000 mL    Oral Fluid: 780 mL  Total IN: 3380 mL    OUT:    Indwelling Catheter - Urethral: 400 mL    Voided: 2050 mL  Total OUT: 2450 mL    Total NET: 930 mL      21 May 2020 07:01  -  22 May 2020 04:20  --------------------------------------------------------  IN:    Oral Fluid: 240 mL  Total IN: 240 mL    OUT:    Voided: 700 mL  Total OUT: 700 mL    Total NET: -460 mL      MEDICATIONS  (STANDING):  acetaminophen   Tablet .. 1000 milliGRAM(s) Oral every 6 hours  artificial  tears Solution 1 Drop(s) Right EYE every 2 hours  enoxaparin Injectable 40 milliGRAM(s) SubCutaneous daily  pantoprazole    Tablet 40 milliGRAM(s) Oral two times a day  petrolatum Ophthalmic Ointment 1 Application(s) Right EYE daily    MEDICATIONS  (PRN):  Biotene Dry Mouth Oral Rinse 5 milliLiter(s) Swish and Spit two times a day PRN Mouth Care  morphine  - Injectable 2 milliGRAM(s) IV Push every 4 hours PRN Severe Pain (7 - 10)      LABS:                        12.6   13.95 )-----------( 176      ( 21 May 2020 05:35 )             38.2     05-21    141  |  106  |  14  ----------------------------<  102<H>  4.5   |  25  |  0.97    Ca    8.6      21 May 2020 05:35  Phos  2.1     05-21  Mg     2.2     05-21

## 2020-05-22 NOTE — DISCHARGE NOTE PROVIDER - NSDCCPTREATMENT_GEN_ALL_CORE_FT
PRINCIPAL PROCEDURE  Procedure: Laparoscopic partial gastrectomy  Findings and Treatment: converted to open due to adhesions

## 2020-05-22 NOTE — PROGRESS NOTE ADULT - ASSESSMENT
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 patient recovering appropriately on the floor.    Plan:  - Pain control as needed  - DVT ppx with lovenox  - OOB and ambulating as tolerated   - Pass    D team surgery  [id number]. 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 patient recovering appropriately on the floor.    Plan:  - Pain control as needed  - DVT ppx with lovenox  - OOB and ambulating as tolerated   - Discharge home     D team surgery  [id number].

## 2020-05-22 NOTE — DISCHARGE NOTE NURSING/CASE MANAGEMENT/SOCIAL WORK - PATIENT PORTAL LINK FT
You can access the FollowMyHealth Patient Portal offered by [facility] by registering at the following website: [email or web address]. By joining [employer]’s FollowMyHealth portal, you will also be able to view your health information using other applications (apps) compatible with our system.

## 2020-05-22 NOTE — DISCHARGE NOTE PROVIDER - CARE PROVIDER_API CALL
Armando Craig  [address] [address]  Phone: [phone]  Fax: [phone]  Follow Up Time: 2 weeks

## 2020-05-22 NOTE — DISCHARGE NOTE PROVIDER - NSDCMRMEDTOKEN_GEN_ALL_CORE_FT
enoxaparin 40 mg/0.4 mL injectable solution: 40 milligram(s) subcutaneously once a day   rosuvastatin 10 mg oral tablet: 1 tab(s) orally once a day  valsartan 160 mg oral tablet: 1 tab(s) orally once a day enoxaparin 40 mg/0.4 mL injectable solution: 40 milligram(s) subcutaneously once a day   Keflex 500 mg oral capsule: 1 cap(s) orally every 6 hours   rosuvastatin 10 mg oral tablet: 1 tab(s) orally once a day  valsartan 160 mg oral tablet: 1 tab(s) orally once a day

## 2020-05-28 ENCOUNTER — APPOINTMENT (OUTPATIENT)
Dept: SURGICAL ONCOLOGY | Facility: CLINIC | Age: 70
End: 2020-05-28
Payer: MEDICARE

## 2020-05-28 VITALS
RESPIRATION RATE: 16 BRPM | SYSTOLIC BLOOD PRESSURE: 110 MMHG | OXYGEN SATURATION: 96 % | TEMPERATURE: 98 F | WEIGHT: 190 LBS | HEART RATE: 76 BPM | DIASTOLIC BLOOD PRESSURE: 71 MMHG | HEIGHT: 69 IN | BODY MASS INDEX: 28.14 KG/M2

## 2020-05-28 PROCEDURE — 99024 POSTOP FOLLOW-UP VISIT: CPT

## 2020-06-04 ENCOUNTER — APPOINTMENT (OUTPATIENT)
Dept: SURGICAL ONCOLOGY | Facility: CLINIC | Age: 70
End: 2020-06-04
Payer: MEDICARE

## 2020-06-04 VITALS
OXYGEN SATURATION: 95 % | DIASTOLIC BLOOD PRESSURE: 70 MMHG | RESPIRATION RATE: 16 BRPM | TEMPERATURE: 98.3 F | HEART RATE: 63 BPM | SYSTOLIC BLOOD PRESSURE: 121 MMHG

## 2020-06-04 PROCEDURE — 99024 POSTOP FOLLOW-UP VISIT: CPT

## 2020-06-04 NOTE — CONSULT LETTER
[Dear  ___] : Dear ~DANICA, [Consult Closing:] : Thank you very much for allowing me to participate in the care of this patient.  If you have any questions, please do not hesitate to contact me. [Consult Letter:] : I had the pleasure of evaluating your patient, [unfilled]. [Please see my note below.] : Please see my note below. [FreeTextEntry2] : Eric Mohr MD  [Sincerely,] : Sincerely, [FreeTextEntry3] : Armando Craig MD\par Surgical Oncology\par [facility]\par Office: [phone]\par Cell: [phone] [FreeTextEntry1] : Mr. Galindo is a pleasant 70 -year-old gentleman here for a postoperative visit. 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 His postoperative course was uneventful. Final pathology demonstrates a 1.5 cm gastric GIST with low risk features. Margins are negative, T1NxMx.\par \par He was seen last week in the office with concerns for drainage from his midline incision. Fat necrosis was expressed.  He returns today for ongoing evaluation. He denies any difficulties tolerating a diet. He is passing gas and having bowel movements. He is beginning to slowly return to baseline activities.\par \par On exam, the midline incision is healing. There is an area of fluctuance which is probed and approximately 10 cc of fat necrosis is expressed. A dry sterile dressing is applied.\par \par Jose will follow up with me in 3 weeks. I have asked him to follow up with medical oncology to discuss any role for adjuvant therapy. [DrPramod  ___] : Dr. YOUNGBLOOD [DrPramod ___] : Dr. YOUNGBLOOD

## 2020-06-04 NOTE — RESULTS/DATA
[FreeTextEntry1] : CRISTAL BREEN                     \par Surgical Final Report \par Final Diagnosis\par 1. Stomach, lesser curvature, partial gastrectomy:\par - Gastric gastrointestinal stromal tumor (gastric GIST),\par low grade with very low risk\par category, tumor size, 1.5 cm\par - Resection margins, negative for tumor\par - AJCC (8th Ed) staging: [id number], see synoptic report and see\par comment\par \par 2. Additional circumferential margin, excision:\par - Gastric tissue with inactive chronic nonspecific\par gastritis, negative for tumor\par \par Comment:\par The tumor is composed of bland spindled cells with pale to\par eosinophilic cytoplasm, minimal pleomorphism, low mitotic figures\par (1/50 hpf) without evidence of necrosis. Tumor cells are positive\par for , CD34, DOG1 and Caldesmon; while negative for AE1/AE3,\par S100, Desmin and SMA. Ki-67, 1%.\par \par The proposed risk assessment is based on tumor size, 1.5 cm,\par mitotic activity (<5/50 hpf), and no evidence of tumor necrosis.\par The histologic features and the pattern of immunoreactivity are\par in support of the diagnosis, low grade gastric gastrointestinal\par stromal tumor (gastric GIST), with very low risk\par category.\par \par Verified by: MASON LOREDO\par (Electronic Signature)\par Reported on: 05/22/20 12:20 EDT, [address],\[place] [address]\par Phone: [phone]   Fax: [phone]\par _________________________________________________________________

## 2020-06-04 NOTE — PHYSICAL EXAM
[de-identified] : the midline incision is healing. There is an area of fluctuance which is probed and approximately 10 cc of fat necrosis is expressed. A dry sterile dressing is applied.

## 2020-06-04 NOTE — ASSESSMENT
[FreeTextEntry1] : Jose will follow up with me in 3 weeks. I have asked him to follow up with medical oncology to discuss any role for adjuvant therapy.\par

## 2020-06-04 NOTE — HISTORY OF PRESENT ILLNESS
[de-identified] : Mr. Galindo is a pleasant 70 -year-old gentleman here for a postoperative visit. 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 His postoperative course was uneventful. Final pathology demonstrates a 1.5 cm gastric GIST with low risk features. Margins are negative, T1NxMx.\par \par He was seen last week in the office with concerns for drainage from his midline incision. Fat necrosis was expressed.  He returns today for ongoing evaluation. He denies any difficulties tolerating a diet. He is passing gas and having bowel movements. He is beginning to slowly return to baseline activities.\par \par Pertinent History:vilma Schrader is a pleasant 70 year-old male here for an initial consultation. He began to experience intermittent epigastric pain and on 1/6/20 Dr. Ryan Tripathi performed an EGD which revealed a large nodule in the gastric body. Biopsy was performed and pathology demonstrated a Gastric GIST.\par \par He was then referred for a CT of the abdomen and pelvis on 2/5/20 which demonstrated a 1.9 cm soft tissue attenuation mass along the lesser curvature of the stomach without a significant exophytic component. An EUS evaluation was performed by Dr. Eric Mohr on 2/7/20 and revealed a subepithelial lesion in the lesser curvature of the stomach. It was encountered at 5 cm distal to the gastroesophageal junction. It measured 17.5 mm x 12.3 mm in diameter. Differentials included carcinoid, GIST or leiomyoma. FNA was suspicious for neoplasm and favored a GIST. 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 He has a family history of breast cancer involving his mother. He does not smoke or drink alcohol.

## 2020-06-04 NOTE — PHYSICAL EXAM
[de-identified] : the midline incision is healing. There is an area of fluctuance which is probed and approximately 10 cc of fat necrosis is expressed. A dry sterile dressing is applied.\par \par

## 2020-06-04 NOTE — HISTORY OF PRESENT ILLNESS
[de-identified] : Mr. Galindo is a pleasant 70 -year-old gentleman here for a postoperative visit. 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 His postoperative course was uneventful. Final pathology demonstrates a 1.5 cm gastric GIST with low risk features. Margins are negative, T1NxMx.\par \par He was seen last week in the office with concerns for drainage from his midline incision. Fat necrosis was expressed. He returns today for ongoing evaluation. He denies any difficulties tolerating a diet. He is passing gas and having bowel movements. He is beginning to slowly return to baseline activities.\par \par Pertinent History:\taryn Schrader is a pleasant 70 year-old male here for an initial consultation. He began to experience intermittent epigastric pain and on 1/6/20 Dr. Ryan Tripathi performed an EGD which revealed a large nodule in the gastric body. Biopsy was performed and pathology demonstrated a Gastric GIST.\par \par He was then referred for a CT of the abdomen and pelvis on 2/5/20 which demonstrated a 1.9 cm soft tissue attenuation mass along the lesser curvature of the stomach without a significant exophytic component. An EUS evaluation was performed by Dr. Eric Mohr on 2/7/20 and revealed a subepithelial lesion in the lesser curvature of the stomach. It was encountered at 5 cm distal to the gastroesophageal junction. It measured 17.5 mm x 12.3 mm in diameter. Differentials included carcinoid, GIST or leiomyoma. FNA was suspicious for neoplasm and favored a GIST. 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 He has a family history of breast cancer involving his mother. He does not smoke or drink alcohol. \par

## 2020-06-13 ENCOUNTER — EMERGENCY (EMERGENCY)
Facility: HOSPITAL | Age: 70
LOS: 1 days | Discharge: HOME | End: 2020-06-13
Attending: STUDENT IN AN ORGANIZED HEALTH CARE EDUCATION/TRAINING PROGRAM | Admitting: STUDENT IN AN ORGANIZED HEALTH CARE EDUCATION/TRAINING PROGRAM
Payer: MEDICARE

## 2020-06-13 VITALS
OXYGEN SATURATION: 100 % | SYSTOLIC BLOOD PRESSURE: 147 MMHG | TEMPERATURE: 98 F | RESPIRATION RATE: 17 BRPM | HEART RATE: 72 BPM | DIASTOLIC BLOOD PRESSURE: 77 MMHG

## 2020-06-13 VITALS
OXYGEN SATURATION: 100 % | RESPIRATION RATE: 13 BRPM | SYSTOLIC BLOOD PRESSURE: 150 MMHG | HEART RATE: 54 BPM | DIASTOLIC BLOOD PRESSURE: 71 MMHG | TEMPERATURE: 98 F

## 2020-06-13 DIAGNOSIS — Z98.890 OTHER SPECIFIED POSTPROCEDURAL STATES: Chronic | ICD-10-CM

## 2020-06-13 DIAGNOSIS — Z90.81 ACQUIRED ABSENCE OF SPLEEN: Chronic | ICD-10-CM

## 2020-06-13 LAB
ALBUMIN SERPL ELPH-MCNC: 3.8 G/DL — SIGNIFICANT CHANGE UP (ref 3.3–5)
ALP SERPL-CCNC: 85 U/L — SIGNIFICANT CHANGE UP (ref 40–120)
ALT FLD-CCNC: 36 U/L — SIGNIFICANT CHANGE UP (ref 4–41)
ANION GAP SERPL CALC-SCNC: 13 MMO/L — SIGNIFICANT CHANGE UP (ref 7–14)
AST SERPL-CCNC: 33 U/L — SIGNIFICANT CHANGE UP (ref 4–40)
BASOPHILS # BLD AUTO: 0.1 K/UL — SIGNIFICANT CHANGE UP (ref 0–0.2)
BASOPHILS NFR BLD AUTO: 0.9 % — SIGNIFICANT CHANGE UP (ref 0–2)
BASOPHILS NFR SPEC: 0.9 % — SIGNIFICANT CHANGE UP (ref 0–2)
BILIRUB SERPL-MCNC: < 0.2 MG/DL — LOW (ref 0.2–1.2)
BLASTS # FLD: 0 % — SIGNIFICANT CHANGE UP (ref 0–0)
BUN SERPL-MCNC: 20 MG/DL — SIGNIFICANT CHANGE UP (ref 7–23)
CALCIUM SERPL-MCNC: 9.1 MG/DL — SIGNIFICANT CHANGE UP (ref 8.4–10.5)
CHLORIDE SERPL-SCNC: 103 MMOL/L — SIGNIFICANT CHANGE UP (ref 98–107)
CO2 SERPL-SCNC: 25 MMOL/L — SIGNIFICANT CHANGE UP (ref 22–31)
CREAT SERPL-MCNC: 0.9 MG/DL — SIGNIFICANT CHANGE UP (ref 0.5–1.3)
EOSINOPHIL # BLD AUTO: 0.49 K/UL — SIGNIFICANT CHANGE UP (ref 0–0.5)
EOSINOPHIL NFR BLD AUTO: 4.4 % — SIGNIFICANT CHANGE UP (ref 0–6)
EOSINOPHIL NFR FLD: 4.4 % — SIGNIFICANT CHANGE UP (ref 0–6)
GIANT PLATELETS BLD QL SMEAR: PRESENT — SIGNIFICANT CHANGE UP
GLUCOSE SERPL-MCNC: 82 MG/DL — SIGNIFICANT CHANGE UP (ref 70–99)
HCT VFR BLD CALC: 40.6 % — SIGNIFICANT CHANGE UP (ref 39–50)
HGB BLD-MCNC: 13 G/DL — SIGNIFICANT CHANGE UP (ref 13–17)
IMM GRANULOCYTES NFR BLD AUTO: 0.3 % — SIGNIFICANT CHANGE UP (ref 0–1.5)
LYMPHOCYTES # BLD AUTO: 3.75 K/UL — HIGH (ref 1–3.3)
LYMPHOCYTES # BLD AUTO: 33.8 % — SIGNIFICANT CHANGE UP (ref 13–44)
LYMPHOCYTES NFR SPEC AUTO: 34.2 % — SIGNIFICANT CHANGE UP (ref 13–44)
MCHC RBC-ENTMCNC: 28.8 PG — SIGNIFICANT CHANGE UP (ref 27–34)
MCHC RBC-ENTMCNC: 32 % — SIGNIFICANT CHANGE UP (ref 32–36)
MCV RBC AUTO: 89.8 FL — SIGNIFICANT CHANGE UP (ref 80–100)
METAMYELOCYTES # FLD: 0 % — SIGNIFICANT CHANGE UP (ref 0–1)
MONOCYTES # BLD AUTO: 1.65 K/UL — HIGH (ref 0–0.9)
MONOCYTES NFR BLD AUTO: 14.9 % — HIGH (ref 2–14)
MONOCYTES NFR BLD: 7.9 % — SIGNIFICANT CHANGE UP (ref 2–9)
MYELOCYTES NFR BLD: 0 % — SIGNIFICANT CHANGE UP (ref 0–0)
NEUTROPHIL AB SER-ACNC: 38.6 % — LOW (ref 43–77)
NEUTROPHILS # BLD AUTO: 5.06 K/UL — SIGNIFICANT CHANGE UP (ref 1.8–7.4)
NEUTROPHILS NFR BLD AUTO: 45.7 % — SIGNIFICANT CHANGE UP (ref 43–77)
NEUTS BAND # BLD: 0 % — SIGNIFICANT CHANGE UP (ref 0–6)
NRBC # FLD: 0 K/UL — SIGNIFICANT CHANGE UP (ref 0–0)
OTHER - HEMATOLOGY %: 0 — SIGNIFICANT CHANGE UP
PLATELET # BLD AUTO: 316 K/UL — SIGNIFICANT CHANGE UP (ref 150–400)
PLATELET COUNT - ESTIMATE: NORMAL — SIGNIFICANT CHANGE UP
PMV BLD: 12.4 FL — SIGNIFICANT CHANGE UP (ref 7–13)
POTASSIUM SERPL-MCNC: 3.9 MMOL/L — SIGNIFICANT CHANGE UP (ref 3.5–5.3)
POTASSIUM SERPL-SCNC: 3.9 MMOL/L — SIGNIFICANT CHANGE UP (ref 3.5–5.3)
PROMYELOCYTES # FLD: 0 % — SIGNIFICANT CHANGE UP (ref 0–0)
PROT SERPL-MCNC: 6.8 G/DL — SIGNIFICANT CHANGE UP (ref 6–8.3)
RBC # BLD: 4.52 M/UL — SIGNIFICANT CHANGE UP (ref 4.2–5.8)
RBC # FLD: 14.6 % — HIGH (ref 10.3–14.5)
REVIEW TO FOLLOW: YES — SIGNIFICANT CHANGE UP
SODIUM SERPL-SCNC: 141 MMOL/L — SIGNIFICANT CHANGE UP (ref 135–145)
VARIANT LYMPHS # BLD: 14 % — SIGNIFICANT CHANGE UP
WBC # BLD: 11.08 K/UL — HIGH (ref 3.8–10.5)
WBC # FLD AUTO: 11.08 K/UL — HIGH (ref 3.8–10.5)

## 2020-06-13 PROCEDURE — 74177 CT ABD & PELVIS W/CONTRAST: CPT | Mod: 26

## 2020-06-13 PROCEDURE — 99284 EMERGENCY DEPT VISIT MOD MDM: CPT

## 2020-06-13 RX ORDER — CEPHALEXIN 500 MG
1 CAPSULE ORAL
Qty: 14 | Refills: 0
Start: 2020-06-13 | End: 2020-06-19

## 2020-06-13 RX ORDER — CEPHALEXIN 500 MG
500 CAPSULE ORAL ONCE
Refills: 0 | Status: COMPLETED | OUTPATIENT
Start: 2020-06-13 | End: 2020-06-13

## 2020-06-13 RX ORDER — SODIUM CHLORIDE 9 MG/ML
1000 INJECTION INTRAMUSCULAR; INTRAVENOUS; SUBCUTANEOUS ONCE
Refills: 0 | Status: COMPLETED | OUTPATIENT
Start: 2020-06-13 | End: 2020-06-13

## 2020-06-13 RX ADMIN — SODIUM CHLORIDE 1000 MILLILITER(S): 9 INJECTION INTRAMUSCULAR; INTRAVENOUS; SUBCUTANEOUS at 15:40

## 2020-06-13 RX ADMIN — SODIUM CHLORIDE 1000 MILLILITER(S): 9 INJECTION INTRAMUSCULAR; INTRAVENOUS; SUBCUTANEOUS at 14:28

## 2020-06-13 RX ADMIN — Medication 500 MILLIGRAM(S): at 18:59

## 2020-06-13 NOTE — ED PROVIDER NOTE - ATTENDING CONTRIBUTION TO CARE
I performed a history and physical exam of the patient and discussed their management with the resident.  I reviewed the resident's note and agree with the documented findings and plan of care except as noted below. My medical decision making and observations are as follows:    70M PMH HTN, HLD s/p Laparoscopic partial gastrectomy converted to open on 5/19 presents with drainage from surgical wound. Pt states past few days he has had straw colored drainage from his abdominal wound. States he feels fluid build up around the incision and then he can press it out.  Denies abd pain, f/c, nausea/vomiting/diarrhea.  Pt well appearing, heart rrr, lungs cta, abd soft with well healing midline incision with <1cm portion that is slightly opened but no drainage appreciated, no surrounding erythema or purulence.  Possible seroma/abscess/wound dehiscence.  Will check labs, CT and call surgery to eval patient.

## 2020-06-13 NOTE — ED PROVIDER NOTE - PATIENT PORTAL LINK FT
You can access the FollowMyHealth Patient Portal offered by [facility] by registering at the following website: [email or web address]. By joining [facility]’s FollowMyHealth portal, you will also be able to view your health information using other applications (apps) compatible with our system.

## 2020-06-13 NOTE — ED PROVIDER NOTE - NS ED ATTENDING STATEMENT MOD
I have personally seen and examined this patient.  I have fully participated in the care of this patient. I have reviewed all pertinent clinical information, including history, physical exam, plan and the Resident’s note and agree except as noted.
Patient with one or more new problems requiring additional work-up/treatment.

## 2020-06-13 NOTE — ED ADULT NURSE REASSESSMENT NOTE - NS ED NURSE REASSESS COMMENT FT1
Surgical team at bedside assessing patient, surgeon retrieved wound cultures for eval. Patient tolerated exam well, will continue to monitor patient.
Alert and oriented x 3, normal mood and affect, no apparent risk to self or others.

## 2020-06-13 NOTE — ED PROVIDER NOTE - NSFOLLOWUPINSTRUCTIONS_ED_ALL_ED_FT
1. TAKE ALL MEDICATIONS AS DIRECTED.    2. FOR PAIN YOU CAN TAKE IBUPROFEN (MOTRIN, ADVIL) OR ACETAMINOPHEN (TYLENOL) AS NEEDED, AS DIRECTED ON PACKAGING.  3. FOLLOW UP WITH YOUR PRIMARY DOCTOR WITHIN 5 DAYS AS DIRECTED.  4. IF YOU HAD LABS OR IMAGING DONE, YOU WERE GIVEN COPIES OF ALL LABS AND/OR IMAGING RESULTS FROM YOUR ER VISIT--PLEASE TAKE THEM WITH YOU TO YOUR FOLLOW UP APPOINTMENTS.  5. IF NEEDED, CALL PATIENT ACCESS SERVICES AT [phone] TO FIND A PRIMARY CARE PHYSICIAN.  OR CALL [phone] TO MAKE AN APPOINTMENT WITH THE CLINIC.  6. RETURN TO THE ER FOR ANY WORSENING SYMPTOMS OR CONCERNS.     Please return to the ER immediately if you are having fevers, increasing pain, redness, purulent drainage or any other concerning symptoms.     Please take the cephalexin 2x daily as prescribed.     Please follow up with Dr. Craig in the next 1 week.

## 2020-06-13 NOTE — ED PROVIDER NOTE - CARE PROVIDER_API CALL
Armando Craig  [address] [address]  Phone: [phone]  Fax: [phone]  Follow Up Time: 4-6 Days

## 2020-06-13 NOTE — CONSULT NOTE ADULT - ASSESSMENT
ASSESSMENT: Patient is a 70M pmhx HTN, GIST s/p partial gastrectomy (5/19/20) who presents for persistent drainage from his midline wound.    PLAN:   - Wound probed with Q-tip at bedside. 3mm hole opened with drainage of serous fluid. Track probed about 5cm superiorly and 3cm inferiorly. Wound packed with 1/4" packing and dressed with gauze  - Patient given materials and instructions to change packing  - Patient instructed to make an appointment to see Dr. Craig in his office this week  - Ordered for a 1 week course of cefalexin   - Return to ED with any worsening of symptoms    Discussed with Dr. Kiara Conway, PGY-3  D Team Surgery [id number]

## 2020-06-13 NOTE — CONSULT NOTE ADULT - SUBJECTIVE AND OBJECTIVE BOX
GENERAL SURGERY CONSULT NOTE  --------------------------------------------------------------------------------------------    HPI:  70M pmhx HTN, GIST s/p partial gastrectomy (5/19/20) who presents for persistent drainage from his midline wound. The patient reports that since his surgery 3 weeks ago he's had some intermittent drainage from his midline laparotomy site. He has seen Dr. Craig in the office, most recently last week, at which time the wound was probed with a Q-tip and some "beer colored" drainage came out. The patient reports that as recently as this morning he's collected about 10cc of yellow-colored drainage. He denies any abdominal pain, fevers, chills, weight loss, PO intolerance, or changes in GI function.      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  H/O ventral hernia repair: 1999 with mesh      FAMILY HISTORY:  Family history of CHF (congestive heart failure): father  FHx: diabetes mellitus: mother  Family history not pertinent as reviewed with the patient and family      ALLERGIES: No Known Allergies      CURRENT MEDICATIONS  MEDICATIONS (STANDING):   MEDICATIONS (PRN):  --------------------------------------------------------------------------------------------    Vitals:   T(C): 36.4 (06-13-20 @ 15:07), Max: 36.7 (06-13-20 @ 13:32)  HR: 54 (06-13-20 @ 15:07) (54 - 72)  BP: 150/71 (06-13-20 @ 15:07) (147/77 - 159/78)  RR: 13 (06-13-20 @ 15:07) (13 - 17)  SpO2: 100% (06-13-20 @ 15:07) (98% - 100%)  CAPILLARY BLOOD GLUCOSE      PHYSICAL EXAM:  General: NAD, Lying in bed comfortably  Neuro: A+Ox3  HEENT: NC/AT, EOMI  Neck: Soft, supple  Cardio: RRR, nml S1/S2  Resp: Good effort, CTA b/l  GI/Abd: Soft, NT/ND. Midline surgical scar well healed with some areas of spotty erythema. No palpable fluctuance. No spontaneous drainage. No rebound/guarding, no masses palpated  Vascular: All 4 extremities warm.  Skin: Intact, no breakdown  Lymphatic/Nodes: No palpable lymphadenopathy  Musculoskeletal: All 4 extremities moving spontaneously, no limitations  --------------------------------------------------------------------------------------------    LABS  CBC (06-13 @ 14:26)                              13.0                           11.08<H>  )----------------(  316        45.7  % Neutrophils, 33.8  % Lymphocytes, ANC: 5.06                                40.6      BMP (06-13 @ 14:26)             141     |  103     |  20    		Ca++ --      Ca 9.1                ---------------------------------( 82    		Mg --                 3.9     |  25      |  0.90  			Ph --        LFTs (06-13 @ 14:26)      TPro 6.8 / Alb 3.8 / TBili < 0.2<L> / DBili -- / AST 33 / ALT 36 / AlkPhos 85            --------------------------------------------------------------------------------------------    MICROBIOLOGY      --------------------------------------------------------------------------------------------    IMAGING  < from: CT Abdomen and Pelvis w/ IV Cont (06.13.20 @ 16:49) >  FINDINGS:  LOWER CHEST: Bilateral subsegmental atelectasis. Aortic valve and coronary artery calcifications.    LIVER: Within normal limits.  BILE DUCTS: Normal caliber.  GALLBLADDER: Within normal limits.  SPLEEN: Splenectomy. 1.2 cm soft tissue density in the left upper quadrant (2.41), may represent residual tissue.  PANCREAS: Within normal limits.  ADRENALS: Within normal limits.  KIDNEYS/URETERS: Indeterminate 1 cm left mid pole hypoechoic structure.    BLADDER: Within normal limits.  REPRODUCTIVE ORGANS: Prostate within normal limits.    BOWEL: Status post partial gastrectomy. Diverticulosis without diverticulitis. No bowel obstruction. Appendix is not visualized.  PERITONEUM: No ascites.  VESSELS: Atherosclerotic changes.  RETROPERITONEUM/LYMPH NODES: No lymphadenopathy.    ABDOMINAL WALL: Postsurgical changes; extensive ill-defined inflammatory and phlegmonous changes in the anterior abdominal wall above the umbilicus with suggestion of a 1.6 x 1.4 x 6.8 cm fluid collection.  BONES: Within normal limits.    IMPRESSION:   Extensive ill-defined inflammatory and phlegmonous changes in the anterior abdominal wall above the umbilicus with suggestion of a 1.6 x 1.4 x 6.8 cm hypodense collection with minimally enhancing walls, likely an early forming abscess.    < end of copied text >

## 2020-06-13 NOTE — ED ADULT NURSE NOTE - OBJECTIVE STATEMENT
71 y/o male presents to ED complaining of drainage from abdominal incision site. Pt endorses having a benign tumor removed ~ 3weeks ago from abdomen. Pt endorses having 8ml of yellow drainage from site today. Surgeon sent patient here for assessment. Pt a&ox4, no distress noted. Abdomen warm to touch, no pain noted, no drainage at this time. MD in to assess patient. 20g IV placed to LAC. Medicated and labs obtained as per orders. Will continue to monitor.

## 2020-06-13 NOTE — ED PROVIDER NOTE - CLINICAL SUMMARY MEDICAL DECISION MAKING FREE TEXT BOX
70M presents with drainage from midline surgical wound. Does not have s/s of active infection. ddx: fat necrosis, hematoma/seroma, wound dehiscence. Possibly abscess or intraabdominal complication. Will obtain basic labs and CT AP. Surgery to see.

## 2020-06-13 NOTE — ED PROVIDER NOTE - NS ED ROS FT
REVIEW OF SYSTEMS:  General:  no fever, no chills  HEENT: no headache, no vision changes  Cardiac: no chest pain, no palpitations  Respiratory: no cough, no shortness of breath  Gastrointestinal: no abdominal pain, no nausea, no vomiting, no diarrhea  Genitourinary: no hematuria, no dysuria, no urinary frequency  Extremities: no extremity swelling, no extremity pain  Neuro: no focal weakness, no numbness/tingling of the extremities, no decreased sensation  Heme: no easy bleeding, no easy bruising  Skin: +drainage from surgical wound. no jaundice,  no rashes, no lesions  All other ROS as documented in HPI  -Artis Montanez, PGY-2

## 2020-06-13 NOTE — ED PROVIDER NOTE - PHYSICAL EXAMINATION
General: Well developed, well nourished  HEENT: Normocephalic and atraumatic, Trachea midline.   Cardiac: Normal S1 and S2 w/ RRR. No MRG.  Pulmonary: CTA bilaterally. No increased WOB.   Abdominal: Soft, NTND. Midline surgical incision has 2mm defect, no active drainage at this time. No erythema or induration    Neurologic: No focal sensory or motor deficits.  Musculoskeletal: No limited ROM.  Vascular: Warm and well perfused  Skin: Color appropriate for race.   Psychiatric: Appropriate mood and affect. No apparent risk to self or others.  Artis Montanez M.D. PGY-2

## 2020-06-13 NOTE — ED PROVIDER NOTE - OBJECTIVE STATEMENT
70M PMH HTN, HLD s/p Laparoscopic partial gastrectomy converted to open on 5/19 presents with drainage from surgical wound. Pt states past few days he has had straw colored drainage from his abdominal wound. States he feels fluid build up around the incision and then he can press it out. States soaking multiple gauze bandages. This AM collected 8ml of fluids. States fluid is becoming slightly thicker last day or 2. Saw Dr. Ramirez partner today in office and was referred to ER. No fevers. No abd pain. No n/v/d.

## 2020-06-15 LAB
CULTURE RESULTS: SIGNIFICANT CHANGE UP
CULTURE RESULTS: SIGNIFICANT CHANGE UP
SPECIMEN SOURCE: SIGNIFICANT CHANGE UP
SPECIMEN SOURCE: SIGNIFICANT CHANGE UP

## 2020-06-18 ENCOUNTER — APPOINTMENT (OUTPATIENT)
Dept: SURGICAL ONCOLOGY | Facility: CLINIC | Age: 70
End: 2020-06-18
Payer: MEDICARE

## 2020-06-18 VITALS
DIASTOLIC BLOOD PRESSURE: 77 MMHG | RESPIRATION RATE: 16 BRPM | HEART RATE: 55 BPM | SYSTOLIC BLOOD PRESSURE: 143 MMHG | OXYGEN SATURATION: 98 %

## 2020-06-18 VITALS — TEMPERATURE: 97 F | HEIGHT: 69 IN

## 2020-06-18 PROCEDURE — 99024 POSTOP FOLLOW-UP VISIT: CPT

## 2020-06-18 NOTE — HISTORY OF PRESENT ILLNESS
[de-identified] : Mr. Galindo is a pleasant 70 -year-old gentleman here for a postoperative visit. 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 His postoperative course was uneventful. Final pathology demonstrates a 1.5 cm gastric GIST with low risk features. Margins are negative, T1NxMx.\par \par He was seen last week in the office on 5/28/20 with concerns for drainage from his midline incision. Fat necrosis was expressed.  He return on 6/8/20 for ongoing evaluation. On exam, the midline incision was healing. There was an area of fluctuance which was probed and approximately 10 cc of fat necrosis was expressed. I asked him to follow up with medical oncology and return to the office in 3 weeks.\par \par He denies any difficulties tolerating a diet. He is passing gas and having bowel movements. He is beginning to slowly return to baseline activities.\par \par Pertinent History:vilma Schrader is a pleasant 70 year-old male here for an initial consultation. He began to experience intermittent epigastric pain and on 1/6/20 Dr. Ryan Tripathi performed an EGD which revealed a large nodule in the gastric body. Biopsy was performed and pathology demonstrated a Gastric GIST.\par \par He was then referred for a CT of the abdomen and pelvis on 2/5/20 which demonstrated a 1.9 cm soft tissue attenuation mass along the lesser curvature of the stomach without a significant exophytic component. An EUS evaluation was performed by Dr. Eric Mohr on 2/7/20 and revealed a subepithelial lesion in the lesser curvature of the stomach. It was encountered at 5 cm distal to the gastroesophageal junction. It measured 17.5 mm x 12.3 mm in diameter. Differentials included carcinoid, GIST or leiomyoma. FNA was suspicious for neoplasm and favored a GIST. 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 He has a family history of breast cancer involving his mother. He does not smoke or drink alcohol.

## 2020-06-18 NOTE — CONSULT LETTER
[Dear  ___] : Dear ~DANICA, [Consult Letter:] : I had the pleasure of evaluating your patient, [unfilled]. [Please see my note below.] : Please see my note below. [Consult Closing:] : Thank you very much for allowing me to participate in the care of this patient.  If you have any questions, please do not hesitate to contact me. [Sincerely,] : Sincerely, [DrPramod  ___] : Dr. YOUNGBLOOD [DrPramod ___] : Dr. YOUNGBLOOD [FreeTextEntry2] : Eric Mohr MD  [FreeTextEntry3] : Armando Craig MD\par Surgical Oncology\par [facility]\par Office: [phone]\par Cell: [phone]

## 2020-06-18 NOTE — REASON FOR VISIT
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

## 2020-06-25 ENCOUNTER — APPOINTMENT (OUTPATIENT)
Dept: SURGICAL ONCOLOGY | Facility: CLINIC | Age: 70
End: 2020-06-25
Payer: MEDICARE

## 2020-06-25 VITALS
HEART RATE: 60 BPM | DIASTOLIC BLOOD PRESSURE: 72 MMHG | HEIGHT: 69 IN | SYSTOLIC BLOOD PRESSURE: 119 MMHG | OXYGEN SATURATION: 97 % | RESPIRATION RATE: 16 BRPM | BODY MASS INDEX: 28.14 KG/M2 | WEIGHT: 190 LBS

## 2020-06-25 VITALS — TEMPERATURE: 98 F

## 2020-06-25 PROCEDURE — 99024 POSTOP FOLLOW-UP VISIT: CPT

## 2020-06-25 NOTE — HISTORY OF PRESENT ILLNESS
[de-identified] : Mr. Galindo is a pleasant 70 -year-old gentleman here for a postoperative visit. 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 His postoperative course was uneventful. Final pathology demonstrates a 1.5 cm gastric GIST with low risk features. Margins are negative, T1NxMx.\par \par He was seen in the office on 5/28/20 with concerns for drainage from his midline incision. Fat necrosis was expressed.  He returned on 6/8/20 for ongoing evaluation. On exam, the midline incision was healing. There was an area of fluctuance which was probed and approximately 10 cc of fat necrosis was expressed. I asked him to follow up with medical oncology and return to the office in 3 weeks.\par \par He returned on 6/18/20 for a wound evaluation and dressing change.  I asked him to continue antibiotics and follow up in one week.\par \par He denies any difficulties tolerating a diet. He is passing gas and having bowel movements. He is beginning to slowly return to baseline activities.\par \par Pertinent History:vilma Schrader is a pleasant 70 year-old male here for an initial consultation. He began to experience intermittent epigastric pain and on 1/6/20 Dr. Ryan Tripathi performed an EGD which revealed a large nodule in the gastric body. Biopsy was performed and pathology demonstrated a Gastric GIST.\par \par He was then referred for a CT of the abdomen and pelvis on 2/5/20 which demonstrated a 1.9 cm soft tissue attenuation mass along the lesser curvature of the stomach without a significant exophytic component. An EUS evaluation was performed by Dr. Eric Mohr on 2/7/20 and revealed a subepithelial lesion in the lesser curvature of the stomach. It was encountered at 5 cm distal to the gastroesophageal junction. It measured 17.5 mm x 12.3 mm in diameter. Differentials included carcinoid, GIST or leiomyoma. FNA was suspicious for neoplasm and favored a GIST. 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 He has a family history of breast cancer involving his mother. He does not smoke or drink alcohol.

## 2020-06-25 NOTE — CONSULT LETTER
[Dear  ___] : Dear ~DANICA, [Consult Letter:] : I had the pleasure of evaluating your patient, [unfilled]. [Please see my note below.] : Please see my note below. [Sincerely,] : Sincerely, [Consult Closing:] : Thank you very much for allowing me to participate in the care of this patient.  If you have any questions, please do not hesitate to contact me. [DrPramod  ___] : Dr. YOUNGBLOOD [DrPramod ___] : Dr. YOUNGBLOOD [FreeTextEntry2] : Eric Mohr MD  [FreeTextEntry3] : Armando Craig MD\par Surgical Oncology\par [facility]\par Office: [phone]\par Cell: [phone]

## 2020-07-02 ENCOUNTER — APPOINTMENT (OUTPATIENT)
Dept: SURGICAL ONCOLOGY | Facility: CLINIC | Age: 70
End: 2020-07-02
Payer: MEDICARE

## 2020-07-02 VITALS
SYSTOLIC BLOOD PRESSURE: 125 MMHG | OXYGEN SATURATION: 96 % | RESPIRATION RATE: 16 BRPM | HEART RATE: 62 BPM | DIASTOLIC BLOOD PRESSURE: 79 MMHG

## 2020-07-02 VITALS — TEMPERATURE: 96.9 F

## 2020-07-02 PROCEDURE — 99024 POSTOP FOLLOW-UP VISIT: CPT

## 2020-07-02 NOTE — HISTORY OF PRESENT ILLNESS
[de-identified] : Mr. Galindo is a pleasant 70 -year-old gentleman here for a postoperative visit. 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 His postoperative course was uneventful. Final pathology demonstrates a 1.5 cm gastric GIST with low risk features. Margins are negative, T1NxMx.\par \par He was seen in the office on 5/28/20 with concerns for drainage from his midline incision. Fat necrosis was expressed.  He returned on 6/8/20 for ongoing evaluation. On exam, the midline incision was healing. There was an area of fluctuance which was probed and approximately 10 cc of fat necrosis was expressed. I asked him to follow up with medical oncology and return to the office in 3 weeks.\par \par He returned on 6/18/20 for a wound evaluation and dressing change.  I asked him to continue antibiotics and follow up in one week.  He was last seen on 6/25/20.  I asked him to continue dressing changes and increase Keflex to TID.\par \par He denies any difficulties tolerating a diet. He is passing gas and having bowel movements. He is beginning to slowly return to baseline activities.\par \par Pertinent History:vilma Schrader is a pleasant 70 year-old male here for an initial consultation. He began to experience intermittent epigastric pain and on 1/6/20 Dr. Ryan Tripathi performed an EGD which revealed a large nodule in the gastric body. Biopsy was performed and pathology demonstrated a Gastric GIST.\par \par He was then referred for a CT of the abdomen and pelvis on 2/5/20 which demonstrated a 1.9 cm soft tissue attenuation mass along the lesser curvature of the stomach without a significant exophytic component. An EUS evaluation was performed by Dr. Eric Mohr on 2/7/20 and revealed a subepithelial lesion in the lesser curvature of the stomach. It was encountered at 5 cm distal to the gastroesophageal junction. It measured 17.5 mm x 12.3 mm in diameter. Differentials included carcinoid, GIST or leiomyoma. FNA was suspicious for neoplasm and favored a GIST. 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 He has a family history of breast cancer involving his mother. He does not smoke or drink alcohol.

## 2020-07-02 NOTE — CONSULT LETTER
[Dear  ___] : Dear ~DANICA, [Consult Letter:] : I had the pleasure of evaluating your patient, [unfilled]. [Please see my note below.] : Please see my note below. [Consult Closing:] : Thank you very much for allowing me to participate in the care of this patient.  If you have any questions, please do not hesitate to contact me. [Sincerely,] : Sincerely, [DrPramod  ___] : Dr. YOUNGBLOOD [DrPramod ___] : Dr. YOUNGBLOOD [FreeTextEntry2] : Eric Mohr MD  [FreeTextEntry3] : Armando Craig MD\par Surgical Oncology\par [facility]\par Office: [phone]\par Cell: [phone]

## 2020-07-09 ENCOUNTER — APPOINTMENT (OUTPATIENT)
Dept: SURGICAL ONCOLOGY | Facility: CLINIC | Age: 70
End: 2020-07-09
Payer: MEDICARE

## 2020-07-09 VITALS
RESPIRATION RATE: 16 BRPM | TEMPERATURE: 97 F | DIASTOLIC BLOOD PRESSURE: 73 MMHG | OXYGEN SATURATION: 97 % | HEART RATE: 56 BPM | SYSTOLIC BLOOD PRESSURE: 115 MMHG | WEIGHT: 190 LBS | HEIGHT: 69 IN | BODY MASS INDEX: 28.14 KG/M2

## 2020-07-09 LAB — BACTERIA WND CULT: ABNORMAL

## 2020-07-09 PROCEDURE — 99024 POSTOP FOLLOW-UP VISIT: CPT

## 2020-07-09 NOTE — HISTORY OF PRESENT ILLNESS
[de-identified] : Mr. Galindo is a pleasant 70 -year-old gentleman here for continued postoperative evaluation.  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 His postoperative course was uneventful. Final pathology demonstrates a 1.5 cm gastric GIST with low risk features. Margins are negative, T1NxMx.\par \par He was seen in the office on 5/28/20 with concerns for drainage from his midline incision. Fat necrosis was expressed.  He returned on 6/8/20 for ongoing evaluation. On exam, the midline incision was healing. There was an area of fluctuance which was probed and approximately 10 cc of fat necrosis was expressed. I asked him to follow up with medical oncology and return to the office in 3 weeks.\par \par He returned on 6/18/20 for a wound evaluation and dressing change.  I asked him to continue antibiotics and follow up in one week.  He was last seen on 6/25/20.  I asked him to continue dressing changes and increase Keflex to TID.\par \par He denies any difficulties tolerating a diet. He is passing gas and having bowel movements. He is beginning to slowly return to baseline activities.\par \par Pertinent History:vilma Schrader is a pleasant 70 year-old male here for an initial consultation. He began to experience intermittent epigastric pain and on 1/6/20 Dr. Ryan Tripathi performed an EGD which revealed a large nodule in the gastric body. Biopsy was performed and pathology demonstrated a Gastric GIST.\par \par He was then referred for a CT of the abdomen and pelvis on 2/5/20 which demonstrated a 1.9 cm soft tissue attenuation mass along the lesser curvature of the stomach without a significant exophytic component. An EUS evaluation was performed by Dr. Eric Mohr on 2/7/20 and revealed a subepithelial lesion in the lesser curvature of the stomach. It was encountered at 5 cm distal to the gastroesophageal junction. It measured 17.5 mm x 12.3 mm in diameter. Differentials included carcinoid, GIST or leiomyoma. FNA was suspicious for neoplasm and favored a GIST. 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 He has a family history of breast cancer involving his mother. He does not smoke or drink alcohol.

## 2020-07-09 NOTE — PHYSICAL EXAM
[Normal] : well developed, well nourished, in no acute distress [de-identified] : Midline incision is healing. Wound open, dressing changed.  No pus

## 2020-07-09 NOTE — CONSULT LETTER
[Dear  ___] : Dear  [unfilled], [Consult Letter:] : I had the pleasure of evaluating your patient, [unfilled]. [Please see my note below.] : Please see my note below. [Consult Closing:] : Thank you very much for allowing me to participate in the care of this patient.  If you have any questions, please do not hesitate to contact me. [Sincerely,] : Sincerely, [DrPramod  ___] : Dr. YOUNGBLOOD [DrPramod ___] : Dr. YOUNGBLOOD [FreeTextEntry2] : Eric Mohr MD  [FreeTextEntry3] : Armando Craig MD\par Surgical Oncology\par [facility]\par Office: [phone]\par Cell: [phone]

## 2020-07-23 ENCOUNTER — APPOINTMENT (OUTPATIENT)
Dept: SURGICAL ONCOLOGY | Facility: CLINIC | Age: 70
End: 2020-07-23
Payer: MEDICARE

## 2020-07-23 VITALS
HEART RATE: 63 BPM | HEIGHT: 69 IN | SYSTOLIC BLOOD PRESSURE: 134 MMHG | TEMPERATURE: 97.1 F | OXYGEN SATURATION: 97 % | DIASTOLIC BLOOD PRESSURE: 70 MMHG | WEIGHT: 194.25 LBS | BODY MASS INDEX: 28.77 KG/M2

## 2020-07-23 PROCEDURE — 99024 POSTOP FOLLOW-UP VISIT: CPT

## 2020-07-23 RX ORDER — CEPHALEXIN 500 MG/1
500 TABLET ORAL EVERY 8 HOURS
Qty: 42 | Refills: 0 | Status: ACTIVE | COMMUNITY
Start: 2020-06-19 | End: 1900-01-01

## 2020-07-23 NOTE — HISTORY OF PRESENT ILLNESS
[de-identified] : Mr. Galindo is a pleasant 70 -year-old gentleman here for continued postoperative evaluation.  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 His postoperative course was uneventful. Final pathology demonstrates a 1.5 cm gastric GIST with low risk features. Margins are negative, T1NxMx.\par \par He was seen in the office on 5/28/20 with concerns for drainage from his midline incision. Fat necrosis was expressed.  He returned on 6/8/20 for ongoing evaluation. On exam, the midline incision was healing. There was an area of fluctuance which was probed and approximately 10 cc of fat necrosis was expressed. I asked him to follow up with medical oncology and return to the office in 3 weeks.\par \par He returned on 6/18/20 for a wound evaluation and dressing change.  I asked him to continue antibiotics and follow up in one week.  He was last seen on 6/25/20.  I asked him to continue dressing changes and increase Keflex to TID.\par \par He denies any difficulties tolerating a diet. He is passing gas and having bowel movements. He is beginning to slowly return to baseline activities.\par \par Pertinent History:vilma Schrader is a pleasant 70 year-old male here for an initial consultation. He began to experience intermittent epigastric pain and on 1/6/20 Dr. Ryan Tripathi performed an EGD which revealed a large nodule in the gastric body. Biopsy was performed and pathology demonstrated a Gastric GIST.\par \par He was then referred for a CT of the abdomen and pelvis on 2/5/20 which demonstrated a 1.9 cm soft tissue attenuation mass along the lesser curvature of the stomach without a significant exophytic component. An EUS evaluation was performed by Dr. Eric Mohr on 2/7/20 and revealed a subepithelial lesion in the lesser curvature of the stomach. It was encountered at 5 cm distal to the gastroesophageal junction. It measured 17.5 mm x 12.3 mm in diameter. Differentials included carcinoid, GIST or leiomyoma. FNA was suspicious for neoplasm and favored a GIST. 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 He has a family history of breast cancer involving his mother. He does not smoke or drink alcohol.

## 2020-07-23 NOTE — PHYSICAL EXAM
[Normal] : well developed, well nourished, in no acute distress [de-identified] :  the upper aspect of the incision is healed. The abscess cavity has improved significantly. There is no purulence. The wound is repacked.\par

## 2020-07-23 NOTE — CONSULT LETTER
[Please see my note below.] : Please see my note below. [Consult Letter:] : I had the pleasure of evaluating your patient, [unfilled]. [Sincerely,] : Sincerely, [Consult Closing:] : Thank you very much for allowing me to participate in the care of this patient.  If you have any questions, please do not hesitate to contact me. [DrPramod  ___] : Dr. YOUNGBLOOD [DrPramod ___] : Dr. YOUNGBLOOD [Dear  ___] : Dear ~DANICA, [FreeTextEntry2] : Eric Mohr MD  [FreeTextEntry3] : Armando Craig MD\par Surgical Oncology\par [facility]\par Office: [phone]\par Cell: [phone] [FreeTextEntry1] : Mr. Galindo is a pleasant 70 -year-old gentleman here for continued postoperative evaluation.  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 His postoperative course was uneventful. Final pathology demonstrates a 1.5 cm gastric GIST with low risk features. Margins are negative, T1NxMx.\par \par He was seen in the office on 5/28/20 with concerns for drainage from his midline incision. Fat necrosis was expressed.  He returned on 6/8/20 for ongoing evaluation. On exam, the midline incision was healing. There was an area of fluctuance which was probed and approximately 10 cc of fat necrosis was expressed. I asked him to follow up with medical oncology and return to the office in 3 weeks.\par \par He returned on 6/18/20 for a wound evaluation and dressing change.  I asked him to continue antibiotics and follow up in one week.  He was last seen on 6/25/20.  I asked him to continue dressing changes and increase Keflex to TID.\par \par He denies any difficulties tolerating a diet. He is passing gas and having bowel movements. He is beginning to slowly return to baseline activities.\par \par On exam, the upper aspect of the incision is healed. The abscess cavity has improved significantly. There is no purulence. The wound is repacked.\par \par Jose will follow up with me in 3 weeks. I asked him to continue with the current antibiotics for another 2 weeks. He will followup with me sooner if needed.  He will also follow up with medical oncology

## 2020-07-23 NOTE — ASSESSMENT
[FreeTextEntry1] : Jose will follow up with me in 3 weeks. I asked him to continue with the current antibiotics for another 2 weeks. He will followup with me sooner if needed.

## 2020-08-17 ENCOUNTER — APPOINTMENT (OUTPATIENT)
Dept: SURGICAL ONCOLOGY | Facility: CLINIC | Age: 70
End: 2020-08-17
Payer: MEDICARE

## 2020-08-17 VITALS
DIASTOLIC BLOOD PRESSURE: 76 MMHG | SYSTOLIC BLOOD PRESSURE: 135 MMHG | OXYGEN SATURATION: 97 % | TEMPERATURE: 97.5 F | RESPIRATION RATE: 16 BRPM | HEART RATE: 62 BPM

## 2020-08-17 PROCEDURE — 99024 POSTOP FOLLOW-UP VISIT: CPT

## 2020-08-17 NOTE — CONSULT LETTER
[Dear  ___] : Dear ~DANICA, [Consult Letter:] : I had the pleasure of evaluating your patient, [unfilled]. [Please see my note below.] : Please see my note below. [Consult Closing:] : Thank you very much for allowing me to participate in the care of this patient.  If you have any questions, please do not hesitate to contact me. [Sincerely,] : Sincerely, [DrPramod  ___] : Dr. YOUNGBLOOD [FreeTextEntry3] : Armando Craig MD\par Surgical Oncology\par [facility]\par Office: [phone]\par Cell: [phone] [FreeTextEntry2] : Eric Mohr MD  [DrPramod ___] : Dr. YOUNGBLOOD

## 2020-08-17 NOTE — PHYSICAL EXAM
[Normal] : well developed, well nourished, in no acute distress [de-identified] : The incision is healed. The abscess has resolved\par

## 2020-08-17 NOTE — HISTORY OF PRESENT ILLNESS
[de-identified] : Mr. Galindo is a pleasant 70 -year-old gentleman here for continued postoperative evaluation.  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 His postoperative course was uneventful. Final pathology demonstrates a 1.5 cm gastric GIST with low risk features. Margins are negative, T1NxMx.\par \par He was seen in the office on 5/28/20 with concerns for drainage from his midline incision. Fat necrosis was expressed.  He returned on 6/8/20 for ongoing evaluation. On exam, the midline incision was healing. There was an area of fluctuance which was probed and approximately 10 cc of fat necrosis was expressed. I asked him to follow up with medical oncology and return to the office in 3 weeks.\par \par He returned on 6/18/20 for a wound evaluation and dressing change.  I asked him to continue antibiotics and follow up in one week.  He was seen on 6/25/20 at which time I asked him to continue dressing changes and increase Keflex to TID.\par \par During his last visit on 7/23/20 the upper aspect of the wound had healed and the abscess cavity had significantly improved.  I asked him to continue antibiotics for an additional 2 weeks. \par \par He denies any difficulties tolerating a diet. He is passing gas and having bowel movements. He is beginning to slowly return to baseline activities.\par \par Pertinent History:vilma Schrader is a pleasant 70 year-old male here for an initial consultation. He began to experience intermittent epigastric pain and on 1/6/20 Dr. Ryan Tripathi performed an EGD which revealed a large nodule in the gastric body. Biopsy was performed and pathology demonstrated a Gastric GIST.\par \par He was then referred for a CT of the abdomen and pelvis on 2/5/20 which demonstrated a 1.9 cm soft tissue attenuation mass along the lesser curvature of the stomach without a significant exophytic component. An EUS evaluation was performed by Dr. Eric Mohr on 2/7/20 and revealed a subepithelial lesion in the lesser curvature of the stomach. It was encountered at 5 cm distal to the gastroesophageal junction. It measured 17.5 mm x 12.3 mm in diameter. Differentials included carcinoid, GIST or leiomyoma. FNA was suspicious for neoplasm and favored a GIST. 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 He has a family history of breast cancer involving his mother. He does not smoke or drink alcohol.

## 2020-10-08 ENCOUNTER — APPOINTMENT (OUTPATIENT)
Dept: SURGICAL ONCOLOGY | Facility: CLINIC | Age: 70
End: 2020-10-08
Payer: MEDICARE

## 2020-10-08 VITALS
HEART RATE: 69 BPM | TEMPERATURE: 97.5 F | BODY MASS INDEX: 28.03 KG/M2 | OXYGEN SATURATION: 97 % | DIASTOLIC BLOOD PRESSURE: 72 MMHG | HEIGHT: 69 IN | SYSTOLIC BLOOD PRESSURE: 120 MMHG | WEIGHT: 189.25 LBS

## 2020-10-08 PROCEDURE — 99214 OFFICE O/P EST MOD 30 MIN: CPT

## 2020-10-08 NOTE — HISTORY OF PRESENT ILLNESS
[de-identified] : Mr. Galindo is a pleasant 70 -year-old gentleman here for follow up.  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 His postoperative course was notable for a wound infection, which resolved over time with antiobiotic and dressing changes. Final pathology demonstrates a 1.5 cm gastric GIST with low risk features. Margins are negative, T1NxMx.\par \par He reports his wound has healed entirely.  He has returned to baseline activities.  He was visiting his daughter in [place] and had a syncopal episode.  He was seen and evaluated at a local hospital, and was discharged the next day.  He denies any additional episodes.  He does report a bulge along the right side of his midline incision.\par \par Pertinent History:vilma Schrader is a pleasant 70 year-old male here for an initial consultation. He began to experience intermittent epigastric pain and on 1/6/20 Dr. Ryan Tripathi performed an EGD which revealed a large nodule in the gastric body. Biopsy was performed and pathology demonstrated a Gastric GIST.\par \par He was then referred for a CT of the abdomen and pelvis on 2/5/20 which demonstrated a 1.9 cm soft tissue attenuation mass along the lesser curvature of the stomach without a significant exophytic component. An EUS evaluation was performed by Dr. Eric Mohr on 2/7/20 and revealed a subepithelial lesion in the lesser curvature of the stomach. It was encountered at 5 cm distal to the gastroesophageal junction. It measured 17.5 mm x 12.3 mm in diameter. Differentials included carcinoid, GIST or leiomyoma. FNA was suspicious for neoplasm and favored a GIST. 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 He has a family history of breast cancer involving his mother. He does not smoke or drink alcohol.

## 2020-10-08 NOTE — ASSESSMENT
[FreeTextEntry1] : Jose will obtain copies of his imaging from the hospitalization in [place] for my review.  He will follow up in 4 months. He will ultimately need a surveillance endoscopy 6-12 months post op.

## 2020-10-08 NOTE — PHYSICAL EXAM
[Normal] : supple, no neck mass and thyroid not enlarged [Normal Neck Lymph Nodes] : normal neck lymph nodes  [Normal Supraclavicular Lymph Nodes] : normal supraclavicular lymph nodes [Normal Groin Lymph Nodes] : normal groin lymph nodes [Normal Axillary Lymph Nodes] : normal axillary lymph nodes [Normal] : oriented to person, place and time, with appropriate affect [de-identified] : there is laxity of the mid abdominal wall on the right site suggestive of an incisional hernia.\par \par

## 2020-10-08 NOTE — CONSULT LETTER
[Dear  ___] : Dear ~DANICA, [Consult Letter:] : I had the pleasure of evaluating your patient, [unfilled]. [Please see my note below.] : Please see my note below. [Consult Closing:] : Thank you very much for allowing me to participate in the care of this patient.  If you have any questions, please do not hesitate to contact me. [Sincerely,] : Sincerely, [DrPramod  ___] : Dr. YOUNGBLOOD [DrPramod ___] : Dr. YOUNGBLOOD [FreeTextEntry2] : Eric Mohr MD  [FreeTextEntry1] : Mr. Galindo is a pleasant 70 -year-old gentleman here for follow up.  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 His postoperative course was notable for a wound infection, which resolved over time with antiobiotic and dressing changes. Final pathology demonstrates a 1.5 cm gastric GIST with low risk features. Margins are negative, T1NxMx.\par \par He reports his wound has healed entirely.  He has returned to baseline activities.  He was visiting his daughter in [place] and had a syncopal episode.  He was seen and evaluated at a local hospital, and was discharged the next day.  He denies any additional episodes.  He does report a bulge along the right side of his midline incision.\par \par On exam, there is laxity of the mid abdominal wall on the right site suggestive of an incisional hernia.\par \par Jose will obtain copies of his imaging from the hospitalization in [place] for my review.  He will follow up in 4 months. He will ultimately need a surveillance endoscopy 6-12 months post op. [FreeTextEntry3] : Armando Craig MD\par Surgical Oncology\par [facility]\par Office: [phone]\par Cell: [phone]

## 2020-11-16 ENCOUNTER — APPOINTMENT (OUTPATIENT)
Dept: SURGICAL ONCOLOGY | Facility: CLINIC | Age: 70
End: 2020-11-16
Payer: MEDICARE

## 2020-11-16 VITALS
DIASTOLIC BLOOD PRESSURE: 69 MMHG | HEART RATE: 62 BPM | RESPIRATION RATE: 15 BRPM | OXYGEN SATURATION: 95 % | HEIGHT: 69 IN | WEIGHT: 190 LBS | BODY MASS INDEX: 28.14 KG/M2 | SYSTOLIC BLOOD PRESSURE: 117 MMHG

## 2020-11-16 PROCEDURE — 99214 OFFICE O/P EST MOD 30 MIN: CPT

## 2020-11-16 PROCEDURE — 99072 ADDL SUPL MATRL&STAF TM PHE: CPT

## 2020-11-17 NOTE — CONSULT LETTER
[Dear  ___] : Dear ~DANICA, [Consult Letter:] : I had the pleasure of evaluating your patient, [unfilled]. [Please see my note below.] : Please see my note below. [Consult Closing:] : Thank you very much for allowing me to participate in the care of this patient.  If you have any questions, please do not hesitate to contact me. [Sincerely,] : Sincerely, [DrPramod  ___] : Dr. YOUNGBLOOD [DrPramod ___] : Dr. YOUNGBLOOD [FreeTextEntry2] : Eric Mohr MD  [FreeTextEntry3] : Armando Craig MD\par Surgical Oncology\par [facility]\par Office: [phone]\par Cell: [phone]

## 2020-11-17 NOTE — HISTORY OF PRESENT ILLNESS
[de-identified] : Mr. Galindo is a pleasant 70 -year-old gentleman here for follow up.  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 His postoperative course was notable for a wound infection, which resolved over time with antiobiotic and dressing changes. Final pathology demonstrates a 1.5 cm gastric GIST with low risk features. Margins are negative, T1NxMx.\par \par He was last seen on 10/8/20 at which time he reported his wound has healed entirely.  He has returned to baseline activities.  He was visiting his daughter in [place] and had a syncopal episode.  He was seen and evaluated at a local hospital, and was discharged the next day.  He denies any additional episodes.  He does report a bulge along the right side of his midline incision.\par \par He returns today with concerns regarding an incisional hernia.  He notes a bulge to the right of his midline incision that is more prominent with standing.  He denies any obstructive symptoms.  \par \par Pertinent History:vilma Schrader is a pleasant 70 year-old male here for an initial consultation. He began to experience intermittent epigastric pain and on 1/6/20 Dr. Ryan Tripathi performed an EGD which revealed a large nodule in the gastric body. Biopsy was performed and pathology demonstrated a Gastric GIST.\par \par He was then referred for a CT of the abdomen and pelvis on 2/5/20 which demonstrated a 1.9 cm soft tissue attenuation mass along the lesser curvature of the stomach without a significant exophytic component. An EUS evaluation was performed by Dr. Eric Mohr on 2/7/20 and revealed a subepithelial lesion in the lesser curvature of the stomach. It was encountered at 5 cm distal to the gastroesophageal junction. It measured 17.5 mm x 12.3 mm in diameter. Differentials included carcinoid, GIST or leiomyoma. FNA was suspicious for neoplasm and favored a GIST. 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 He has a family history of breast cancer involving his mother. He does not smoke or drink alcohol.

## 2020-11-17 NOTE — PHYSICAL EXAM
[Normal] : supple, no neck mass and thyroid not enlarged [Normal Neck Lymph Nodes] : normal neck lymph nodes  [Normal Supraclavicular Lymph Nodes] : normal supraclavicular lymph nodes [Normal Groin Lymph Nodes] : normal groin lymph nodes [Normal Axillary Lymph Nodes] : normal axillary lymph nodes [Normal] : oriented to person, place and time, with appropriate affect [de-identified] : there is laxity of the mid abdominal wall on the right site consistent with a reducible incisional hernia.\par \par

## 2020-11-17 NOTE — ASSESSMENT
[FreeTextEntry1] : We discussed options regarding his incisional hernia including observation and operative repair.  He is agreeable short term surveillance in 6 months with a repeat CT.  He will follow up with me at that time.

## 2020-11-17 NOTE — REASON FOR VISIT
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

## 2020-11-30 ENCOUNTER — NON-APPOINTMENT (OUTPATIENT)
Age: 70
End: 2020-11-30

## 2020-11-30 RX ORDER — PANTOPRAZOLE 40 MG/1
40 TABLET, DELAYED RELEASE ORAL DAILY
Qty: 30 | Refills: 2 | Status: ACTIVE | COMMUNITY
Start: 2020-04-29 | End: 1900-01-01

## 2020-12-02 NOTE — CONSULT LETTER
[Dear  ___] : Dear ~DANICA, [Consult Letter:] : I had the pleasure of evaluating your patient, [unfilled]. [Please see my note below.] : Please see my note below. [Consult Closing:] : Thank you very much for allowing me to participate in the care of this patient.  If you have any questions, please do not hesitate to contact me. [Sincerely,] : Sincerely, [FreeTextEntry2] : Eric Mohr MD  [FreeTextEntry3] : Armando Craig MD\par Surgical Oncology\par [facility] [place]\par Office: [phone]\par Cell: [phone] [DrPramod  ___] : Dr. YOUNGBLOOD [DrPramod ___] : Dr. YOUNGBLOOD

## 2020-12-03 ENCOUNTER — APPOINTMENT (OUTPATIENT)
Dept: SURGICAL ONCOLOGY | Facility: CLINIC | Age: 70
End: 2020-12-03

## 2020-12-08 ENCOUNTER — NON-APPOINTMENT (OUTPATIENT)
Age: 70
End: 2020-12-08

## 2021-01-23 NOTE — ED ADULT NURSE NOTE - IN THE PAST 12 MONTHS HAVE YOU USED DRUGS OTHER THAN THOSE REQUIRED FOR MEDICAL REASON?
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 Ghosh most recently on 12/1 who presents with finger laceration. At 7pm today, patient tripped on a cord, falling outstretched onto her R hand and into her keys, which pierced the palmar aspect of her middle finger at the MCP. Key did not break or stay stuck inside, and bleeding was well-controlled. C/o pain at laceration site now. Denies head trauma or LOC, and is not on any blood thinners. Unknown last tetanus shot.    EM Caveat:            Past Medical History:   Diagnosis Date   • Arthritis    • Fracture     Left Ankle Jan 2011 after fall at work parking lot   • Neuropathy     Past Surgical History:   Procedure Laterality Date   • COLONOSCOPY      [facility] - YRS AGO   • COSMETIC BREAST AUGMENTATION PRIMARY     • TOTAL ABDOMINAL HYSTERECTOMY W/ BILATERAL SALPINGOOPHORECTOMY     • TOTAL KNEE REPLACEMENT      BOTH KNEES      Additional PMH: remainder non-contributory Additional PSH: remainder non-contributory          Social History     Tobacco Use   • Smoking status: Never Smoker   • Smokeless tobacco: Never Used   Substance Use Topics   • Alcohol use: No     Frequency: Never   • Drug use: No    Family History   Problem Relation Age of Onset   • Diabetes Mother       Additional SH: denies smoking, EtOH, or substance abuse Additional FH: remainder non-contributory          Prior to Admission medications    Medication Sig Start Date End Date Taking? Authorizing Provider   gabapentin (NEURONTIN) 300 MG capsule TAKE 1 CAPSULE THREE TIMES DAILY 1/18/21   Arlene Evans MD   baclofen (LIORESAL) 10 MG tablet Take 1 tablet by mouth 3 times daily. 1/14/21    Arlene Evans MD   HYDROcodone-acetaminophen (NORCO)  MG per tablet Take 1 tablet by mouth every 6 hours. 1/8/21 10/5/21  Arlene Evans MD   topiramate (TOPAMAX) 25 MG tablet TAKE 1 TABLET TWICE DAILY 12/24/20   Arlene Evans MD   dicyclomine (BENTYL) 10 MG capsule TAKE 1 CAPSULE BY MOUTH FOUR TIMES DAILY (BEFORE MEALS AND NIGHTLY). 12/24/20   Arlene Evans MD   docusate sodium-sennosides (SENOKOT S) 50-8.6 MG per tablet Take 1 tablet by mouth nightly. 12/3/20   Ginger Pozo MD   diclofenac (VOLTAREN) 75 MG EC tablet Take 75 mg by mouth 2 times daily.    Outside Provider   buPROPion XL (WELLBUTRIN XL) 150 MG 24 hr tablet TAKE 1 TABLET EVERY DAY 11/28/20   Arlene Evans MD   levothyroxine 100 MCG tablet TAKE 1 TABLET EVERY DAY 11/19/20   Arlene Evans MD   pantoprazole (PROTONIX) 40 MG tablet TAKE 1 TABLET EVERY DAY 8/17/20   Arlene Evans MD   escitalopram (LEXAPRO) 20 MG tablet TAKE 1 TABLET EVERY DAY 6/17/20   Arlene Evans MD   baclofen (LIORESAL) 10 MG tablet TAKE 1 TABLET EVERY 8 HOURS 11/1/18 1/14/22  Outside Provider    No Known Allergies     Constitutional symptoms: No fever, chills.  Skin symptoms: No rash.  Eye symptoms: No vision change.  ENMT symptoms: No sore throat.  Respiratory symptoms: No shortness of breath, no cough.  Cardiovascular symptoms: No chest pain.  Gastrointestinal symptoms: No abdominal pain, no vomiting, no diarrhea.  Genitourinary symptoms: No dysuria, no urgency.  Neurologic symptoms: No headache.  Endocrine symptoms: No polyuria.  Review of Systems    Physical Exam     ED Triage Vitals [01/22/21 1948]   ED Triage Vitals Group      Temp 96.8 °F (36 °C)      Heart Rate (!) 102      Resp 18      /84      SpO2 95 %      EtCO2 mmHg       Height       Weight       Weight Scale Used       BMI (Calculated)       IBW/kg (Calculated)        Vitals with min/max:  Vital Last Value 24 Hour Range   Temperature 98.4 °F (36.9 °C) (01/22/21  2100) No data recorded   Pulse 85 (01/23/21 0339) No data recorded   Respiratory 20 (01/23/21 0339) No data recorded   Non-Invasive  Blood Pressure 113/81 (01/23/21 0339) No data recorded   Pulse Oximetry 95 % (01/23/21 0339) No data recorded   Arterial   Blood Pressure   No data recorded      GEN: Patient is generally well-appearing and in no acute distress  HEENT: Pupils equally round and reactive to light bilaterally, extraocular muscles intact, moist mucous membranes  CV: Regular rate and rhythm, no murmurs rubs or gallops, no extra heart sounds  PULM: Lungs are clear to auscultation bilaterally, with no increased work of breathing  GI: Abdomen is soft nontender and nondistended  : no suprapubic tenderness, no CVA tenderness  MSK:   (RUE)  2cm simple, gaping horizontal laceration at base of R 3rd digit MCP. No appreciable tendinous or muscle involvement. No foreign bodies in wound. Numbness in digits 1 - 3 which is chronic postop, otherwise normal sensation and motor function of radial, ulnar, and median nerves. Preserved flexion/extension at MCP, PIP, DIP of 3rd digit. No anatomic snuffbox tenderness. FROM at wrist and elbow. 2+ pulses throughout, brisk cap refill, soft, flat compartments. Postoperative surgical scar to extensors aspect of median hand and at thenar eminence of same hand is C/D/I.     SKIN: No obvious lesions or ecchymoses  NEURO: Patient is spontaneously moving all limbs with no focal neurologic abnormalities  PSYCH: Patient converses appropriately and displays appropriate mood  Physical Exam    MDM     MDM  Number of Diagnoses or Management Options  Laceration of right middle finger, foreign body presence unspecified, nail damage status unspecified, initial encounter:   Diagnosis management comments: 74F with hx as above who presents after simple horizontal laceration to R 3rd digit MCP.  -Afebrile, HDS, tachycardic initially d/t pain which resolved after adequate analgesia, non-toxic  appearing with simple laceration and intact neurovasculature distally  -Radiographs demonstrate no bony fracture, widening noted at scapholunate junction which is suspected to be postoperative in nature as she clinically has no reproducible ttp  -Tetanus updated, pain controlled, and patient's laceration was irrigated, cleansed, and repaired with #4 5-0 non-absorbable Nylon sutures (see separate procedure note), she was advised to watch for s/s infection/swelling, given return precautions, and instructions to f/u with her PCP + hand surgeon, sutures need to come out in 10 days      Lab/Imaging Results     No results found for this visit on 01/22/21.    XR WRIST MIN 3 VIEWS RIGHT   Final Result   Findings suspicious for ligamentous tear at scapholunate joint. No acute bony findings.       Electronically Signed by: JEN PENA M.D.   Signed on: 01/23/2021 12:24 AM      XR FINGER(S) MIN 2 VIEWS RIGHT   Final Result   1.   NEGATIVE FOR ACUTE FRACTURE, DISLOCATION/TRAUMATIC MALALIGNMENT OR   GEOGRAPHIC BONY ABNORMALITY 3RD DIGIT   2.   SOFT TISSUE SWELLING                      Electronically Signed by: CELY WADE MD    Signed on: 1/22/2021 8:36 PM            Imaging Results          XR WRIST MIN 3 VIEWS RIGHT (Final result)  Result time 01/23/21 00:24:09    Final result                 Impression:    Findings suspicious for ligamentous tear at scapholunate joint. No acute bony findings.     Electronically Signed by: JEN PENA M.D.  Signed on: 01/23/2021 12:24 AM             Narrative:    PROCEDURE INFORMATION:   Exam: XR Right Wrist   Exam date and time: 1/22/2021 11:39 PM   Age: 74 years old   Clinical indication: Laceration without foreign body of right middle finger without damage to nail, initial encounter; Pain and injury or trauma; Fall; Wrist; Additional info: Mere     TECHNIQUE:   Imaging protocol: XR Right wrist.   Views: 3 or more views.     COMPARISON:   No relevant prior studies available.      FINDINGS:   Bones/joints: Somewhat prominent spacing at scapholunate joint. Degenerative changes at 1st carpal metacarpal joint.   Soft tissues:  Soft tissue swelling suggested dorsally.                                XR FINGER(S) MIN 2 VIEWS RIGHT (Final result)  Result time 01/22/21 20:36:51    Final result                 Impression:    1.   NEGATIVE FOR ACUTE FRACTURE, DISLOCATION/TRAUMATIC MALALIGNMENT OR  GEOGRAPHIC BONY ABNORMALITY 3RD DIGIT  2.   SOFT TISSUE SWELLING                Electronically Signed by: CELY WADE MD   Signed on: 1/22/2021 8:36 PM                Narrative:    EXAM: XR FINGER(S) MIN 2 VIEWS RIGHT  PROVIDED CLINICAL INFORMATION/INDICATION FOR EXAM: r/o fb or extent of  injury. Pt fell onto keys and keys went into finger. Large laceration  across base of proximal anterior 3rd digit on right hand, bleeding  controlled. Last tetanus unknown.    TECHNIQUE:  XR FINGER(S) MIN 2 VIEWS RIGHT   COMPARISON: None.    FINDINGS:     Right 3rd digit demonstrates:    Bones: No identifiable fracture, dislocation or geographic bony abnormality  Bones are osteopenic  Joints: Joint spaces show moderate distal interphalangeal joint joint space  narrowing and spurring consistent with degenerative change  Soft Tissues:   Soft tissue irregularity overlying the proximal phalange of the 3rd digit  No foreign body:   Additional Comments: No gas in the soft tissues                                ED Course     ED Course as of Jan 24 1812 Fri Jan 22, 2021 1952 I have entered orders to expedite patient care due to limited ED space capability. I have not examined or personally evaluated this patient.     74 y/f, presents withy deep laceration to rt 3rd digit. XR to r/o any FB or extent of damage    [VS]      ED Course User Index  [VS] Davide Brown CNP     Laceration Repair    Date/Time: 1/22/2021 2:00 AM  Performed by: Silvina Marquez  Authorized by: Silvina Marquez     Consent:     Consent obtained:  Verbal     Consent given by:  Patient    Risks discussed:  Infection, pain, retained foreign body, tendon damage, vascular damage, poor wound healing, poor cosmetic result, need for additional repair and nerve damage  Anesthesia (see MAR for exact dosages):     Anesthesia method:  Nerve block    Block location:  Palmar MCP of 3rd digit with 4-5cc of lidocaine 1% without epi    Block needle gauge:  25 G    Block anesthetic:  Lidocaine 1% w/o epi    Block injection procedure:  Anatomic landmarks identified, introduced needle, negative aspiration for blood, anatomic landmarks palpated and incremental injection    Block outcome:  Anesthesia achieved  Laceration details:     Location: Palmar aspect of 3rd digit MCP (R)    Length (cm):  2  Pre-procedure details:     Preparation:  Patient was prepped and draped in usual sterile fashion and imaging obtained to evaluate for foreign bodies  Exploration:     Wound exploration: wound explored through full range of motion and entire depth of wound probed and visualized      Wound extent: no foreign bodies/material noted, no nerve damage noted, no tendon damage noted and no vascular damage noted    Treatment:     Area cleansed with:  Saline (Chlorhexidine)    Amount of cleaning:  Standard    Visualized foreign bodies/material removed: no    Skin repair:     Repair method:  Sutures    Suture size:  5-0    Suture material:  Nylon    Suture technique:  Simple interrupted    Number of sutures:  4  Approximation:     Approximation:  Close  Post-procedure details:     Patient tolerance of procedure:  Tolerated well, no immediate complications        Diagnosis     ED Diagnosis   1. Laceration of right middle finger, foreign body presence unspecified, nail damage status unspecified, initial encounter         Disposition        Discharge Medication List as of 1/23/2021  3:11 AM      Prior to Admission Medications    Details   gabapentin (NEURONTIN) 300 MG capsule TAKE 1 CAPSULE THREE TIMES  DAILYEprescribe, Disp-270 capsule,R-0      baclofen (LIORESAL) 10 MG tablet Take 1 tablet by mouth 3 times daily.Eprescribe, Disp-60 tablet,R-0      HYDROcodone-acetaminophen (NORCO)  MG per tablet Take 1 tablet by mouth every 6 hours.Eprescribe, Disp-102 tablet,R-0      topiramate (TOPAMAX) 25 MG tablet TAKE 1 TABLET TWICE DAILYEprescribe, Disp-180 tablet,R-0      dicyclomine (BENTYL) 10 MG capsule TAKE 1 CAPSULE BY MOUTH FOUR TIMES DAILY (BEFORE MEALS AND NIGHTLY).Eprescribe, Disp-360 capsule,R-0      docusate sodium-sennosides (SENOKOT S) 50-8.6 MG per tablet Take 1 tablet by mouth nightly.Eprescribe, Disp-30 tablet,R-0      diclofenac (VOLTAREN) 75 MG EC tablet Take 75 mg by mouth 2 times daily.Historical Med      buPROPion XL (WELLBUTRIN XL) 150 MG 24 hr tablet TAKE 1 TABLET EVERY DAYEprescribe, Disp-90 tablet,R-0      levothyroxine 100 MCG tablet TAKE 1 TABLET EVERY DAYEprescribe, Disp-90 tablet,R-0      pantoprazole (PROTONIX) 40 MG tablet TAKE 1 TABLET EVERY DAYEprescribe, Disp-90 tablet,R-0      escitalopram (LEXAPRO) 20 MG tablet TAKE 1 TABLET EVERY DAYEprescribe, Disp-90 tablet, R-3      baclofen (LIORESAL) 10 MG tablet TAKE 1 TABLET EVERY 8 HOURSHistorical Med           Discharge Medication List as of 1/23/2021  3:11 AM        Discharge 1/23/2021  2:57 AM  Mily Solis discharge to home/self care.        Patient and/or family were updated on results and treatment plan.   All questions were answered to satisfaction and the patient and/or family verbalized clear understanding and agreement with the plan.  If being discharged, patient and/or family advised to return to the ED if clinical status does not improve or worsens in any way and follow-up with their PMD + any recommended consultants in the next 24 to 48 hours.     1/24/2021 11:20 PM  _____________  Tony Marquez MD  Emergency Medicine, PGY-2  [facility]  [phone]     Silvina Marquez  Resident  01/24/21 [id number]     No

## 2021-03-18 ENCOUNTER — APPOINTMENT (OUTPATIENT)
Dept: SURGICAL ONCOLOGY | Facility: CLINIC | Age: 71
End: 2021-03-18
Payer: MEDICARE

## 2021-03-18 VITALS
DIASTOLIC BLOOD PRESSURE: 72 MMHG | WEIGHT: 197 LBS | OXYGEN SATURATION: 97 % | HEART RATE: 57 BPM | BODY MASS INDEX: 29.18 KG/M2 | RESPIRATION RATE: 15 BRPM | SYSTOLIC BLOOD PRESSURE: 164 MMHG | HEIGHT: 69 IN

## 2021-03-18 PROCEDURE — 99214 OFFICE O/P EST MOD 30 MIN: CPT

## 2021-03-18 PROCEDURE — 99072 ADDL SUPL MATRL&STAF TM PHE: CPT

## 2021-03-18 NOTE — ASSESSMENT
[FreeTextEntry1] : Raciel will follow up with me in 6 months.  I have asked him to see General Surgery regarding an incisional hernia repair.

## 2021-03-18 NOTE — HISTORY OF PRESENT ILLNESS
[de-identified] : Mr. Galindo is a pleasant 71 -year-old gentleman here for follow up.  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 His postoperative course was notable for a wound infection, which resolved over time with antibiotic and dressing changes. Final pathology demonstrates a 1.5 cm gastric GIST with low risk features. Margins are negative, T1NxMx.\par \par He was seen on 10/8/20 at which time he reported his wound has healed entirely.  He has returned to baseline activities.  He was visiting his daughter in [place] and had a syncopal episode.  He was seen and evaluated at a local hospital, and was discharged the next day.  He denies any additional episodes.  He does report a bulge along the right side of his midline incision.\par \par He returned on 11/16/20 with concerns regarding an incisional hernia.  He noted a bulge to the right of his midline incision that is more prominent with standing.  He denied any obstructive symptoms.  He called the office on 11/30/20 with complaints of nausea, migraines and dull epigastric pain relieved with tums.  I renewed his PPI and asked him to follow up with Dr. Mohr.  Dr. Mohr referred him for a CT of the abdomen and pelvis which was performed on 12/3/20 and revealed a thickened band of tissue along the midline anterior abdominal wall with ventral bulging (may represent a herniorrhaphy repear), a small defect in the left abdominal wall just left of the midline, and a 9 mm nodule in the right kidney, stated to be unchanged but raising possibility of renal cell carcinoma versus a hemorrhagic cyst.\par \par He saw Dr. Silas Vasquez () regarding the right kidney lesion who is not recommending any intervention at this time.\par \par He is here today for an evaluation of a right-sided ventral hernia.  There is no associated pain or nausea/vomiting.  He is interested in having it repaired.\par \par Pertinent History:\taryn Schrader is a pleasant 70 year-old male here for an initial consultation. He began to experience intermittent epigastric pain and on 1/6/20 Dr. Ryan Tripathi performed an EGD which revealed a large nodule in the gastric body. Biopsy was performed and pathology demonstrated a Gastric GIST.\par \par He was then referred for a CT of the abdomen and pelvis on 2/5/20 which demonstrated a 1.9 cm soft tissue attenuation mass along the lesser curvature of the stomach without a significant exophytic component. An EUS evaluation was performed by Dr. Eric Mohr on 2/7/20 and revealed a subepithelial lesion in the lesser curvature of the stomach. It was encountered at 5 cm distal to the gastroesophageal junction. It measured 17.5 mm x 12.3 mm in diameter. Differentials included carcinoid, GIST or leiomyoma. FNA was suspicious for neoplasm and favored a GIST. 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 He has a family history of breast cancer involving his mother. He does not smoke or drink alcohol.

## 2021-03-18 NOTE — PHYSICAL EXAM
[Normal] : supple, no neck mass and thyroid not enlarged [Normal Neck Lymph Nodes] : normal neck lymph nodes  [Normal Supraclavicular Lymph Nodes] : normal supraclavicular lymph nodes [Normal Groin Lymph Nodes] : normal groin lymph nodes [Normal Axillary Lymph Nodes] : normal axillary lymph nodes [Normal] : not distended, non tender, no masses, no hepatosplenomegaly [de-identified] : reducible right-sided ventral hernia.  \par \par

## 2021-03-18 NOTE — CONSULT LETTER
[Dear  ___] : Dear ~DANICA, [Consult Letter:] : I had the pleasure of evaluating your patient, [unfilled]. [Please see my note below.] : Please see my note below. [Consult Closing:] : Thank you very much for allowing me to participate in the care of this patient.  If you have any questions, please do not hesitate to contact me. [Sincerely,] : Sincerely, [DrPramod  ___] : Dr. YOUNGBLOOD [DrPramod ___] : Dr. YOUNGBLOOD [FreeTextEntry2] : Eric Mohr MD  [FreeTextEntry3] : Armando Craig MD\par Surgical Oncology\par [facility]\par Office: [phone]\par Cell: [phone]

## 2021-03-22 ENCOUNTER — APPOINTMENT (OUTPATIENT)
Dept: SURGERY | Facility: CLINIC | Age: 71
End: 2021-03-22
Payer: MEDICARE

## 2021-03-22 VITALS
OXYGEN SATURATION: 97 % | SYSTOLIC BLOOD PRESSURE: 151 MMHG | TEMPERATURE: 97.1 F | BODY MASS INDEX: 29.18 KG/M2 | HEART RATE: 61 BPM | WEIGHT: 197 LBS | HEIGHT: 69 IN | DIASTOLIC BLOOD PRESSURE: 77 MMHG

## 2021-03-22 PROCEDURE — 99202 OFFICE O/P NEW SF 15 MIN: CPT

## 2021-03-22 PROCEDURE — 99212 OFFICE O/P EST SF 10 MIN: CPT

## 2021-03-22 PROCEDURE — 99072 ADDL SUPL MATRL&STAF TM PHE: CPT

## 2021-03-22 NOTE — ASSESSMENT
[FreeTextEntry1] : Pt is a 70 yo man presenting with mass to the right of midline incision consistent with reducible incisional hernia, likely due to prior surgery.

## 2021-03-22 NOTE — REVIEW OF SYSTEMS
[Heartburn] : heartburn [Negative] : Heme/Lymph [Abdominal Pain] : no abdominal pain [Vomiting] : no vomiting [Constipation] : no constipation [Diarrhea] : no diarrhea [Melena] : no melena

## 2021-03-22 NOTE — PHYSICAL EXAM
[Normal Breath Sounds] : Normal breath sounds [Respiratory Effort] : normal respiratory effort [Normal Heart Sounds] : normal heart sounds [Normal Rate and Rhythm] : normal rate and rhythm [2+] : left 2+ [No Rash or Lesion] : No rash or lesion [Alert] : alert [Oriented to Person] : oriented to person [Oriented to Place] : oriented to place [Oriented to Time] : oriented to time [Calm] : calm [JVD] : no jugular venous distention  [de-identified] : Awake and alert, pleasant, sitting comfortably [de-identified] : Atraumatic, normocephalic, moist mucous membranes [de-identified] : No cervical lymphadenopathy [de-identified] : Soft, nontender, non distended. Bowel sounds present. No hepatosplenomegaly. Midline incisional scar present with 6cm hernia to the right of the incision.

## 2021-03-22 NOTE — HISTORY OF PRESENT ILLNESS
[de-identified] : Pt is a 72 yo man with a h/o GIST s/p open repair and ventral hernia repair presenting with mass to the right of the midline incision. He stated the bulge appeared soon after the GIST resection surgery 6 months ago. He remembers that "one side of his stomach was higher." He states that it is more noticeable when he stands or bears down. He states that he does not have any pain. He denies nausea, vomiting, constipation, diarrhea, and bloody stools. He states that he has regular bowel movements and is able to pass flatus. \par \par PMH: Fall in  with broken ribs, ventral hernia, GIST, ITP\par PSH: Ventral hernia repair and splenectomy in , open resection of GIST\par Meds: Valsartan, rosuvastatin\par Allergies:NKDA\par FMH: Mother - breast cancer. Father  at age 92. Sister - alive and well.\par Social history: Never smoked or used any other drugs. Very rarely drinks alcohol. 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

## 2021-03-22 NOTE — PLAN
[FreeTextEntry1] : Plan: Open repair of reducible abdominal hernia, explantation of mesh and others. \par \par All options presented to patient, along with risks and benefits of each. All questions and concerns were addressed with the patient. Pt expresses full understanding of the plan.

## 2021-03-26 ENCOUNTER — OUTPATIENT (OUTPATIENT)
Dept: OUTPATIENT SERVICES | Facility: HOSPITAL | Age: 71
LOS: 1 days | Discharge: HOME | End: 2021-03-26
Payer: MEDICARE

## 2021-03-26 VITALS
HEIGHT: 69 IN | HEART RATE: 76 BPM | OXYGEN SATURATION: 97 % | TEMPERATURE: 97 F | WEIGHT: 195.77 LBS | DIASTOLIC BLOOD PRESSURE: 77 MMHG | RESPIRATION RATE: 16 BRPM | SYSTOLIC BLOOD PRESSURE: 139 MMHG

## 2021-03-26 DIAGNOSIS — K43.9 VENTRAL HERNIA WITHOUT OBSTRUCTION OR GANGRENE: ICD-10-CM

## 2021-03-26 DIAGNOSIS — Z98.890 OTHER SPECIFIED POSTPROCEDURAL STATES: Chronic | ICD-10-CM

## 2021-03-26 DIAGNOSIS — Z90.81 ACQUIRED ABSENCE OF SPLEEN: Chronic | ICD-10-CM

## 2021-03-26 DIAGNOSIS — Z01.818 ENCOUNTER FOR OTHER PREPROCEDURAL EXAMINATION: ICD-10-CM

## 2021-03-26 DIAGNOSIS — K43.2 INCISIONAL HERNIA WITHOUT OBSTRUCTION OR GANGRENE: ICD-10-CM

## 2021-03-26 DIAGNOSIS — I10 ESSENTIAL (PRIMARY) HYPERTENSION: ICD-10-CM

## 2021-03-26 LAB
ANION GAP SERPL CALC-SCNC: 3 MMOL/L — LOW (ref 5–17)
APTT BLD: 38.1 SEC — HIGH (ref 27.5–35.5)
BASOPHILS # BLD AUTO: 0.09 K/UL — SIGNIFICANT CHANGE UP (ref 0–0.2)
BASOPHILS NFR BLD AUTO: 0.9 % — SIGNIFICANT CHANGE UP (ref 0–2)
BUN SERPL-MCNC: 20 MG/DL — SIGNIFICANT CHANGE UP (ref 7–23)
CALCIUM SERPL-MCNC: 8.5 MG/DL — SIGNIFICANT CHANGE UP (ref 8.5–10.1)
CHLORIDE SERPL-SCNC: 106 MMOL/L — SIGNIFICANT CHANGE UP (ref 96–108)
CO2 SERPL-SCNC: 31 MMOL/L — SIGNIFICANT CHANGE UP (ref 22–31)
CREAT SERPL-MCNC: 0.94 MG/DL — SIGNIFICANT CHANGE UP (ref 0.5–1.3)
EOSINOPHIL # BLD AUTO: 0.36 K/UL — SIGNIFICANT CHANGE UP (ref 0–0.5)
EOSINOPHIL NFR BLD AUTO: 3.4 % — SIGNIFICANT CHANGE UP (ref 0–6)
GLUCOSE SERPL-MCNC: 99 MG/DL — SIGNIFICANT CHANGE UP (ref 70–99)
HCT VFR BLD CALC: 43.5 % — SIGNIFICANT CHANGE UP (ref 39–50)
HGB BLD-MCNC: 14.7 G/DL — SIGNIFICANT CHANGE UP (ref 13–17)
IMM GRANULOCYTES NFR BLD AUTO: 0.2 % — SIGNIFICANT CHANGE UP (ref 0–1.5)
INR BLD: 1.15 RATIO — SIGNIFICANT CHANGE UP (ref 0.88–1.16)
LYMPHOCYTES # BLD AUTO: 3.36 K/UL — HIGH (ref 1–3.3)
LYMPHOCYTES # BLD AUTO: 32.2 % — SIGNIFICANT CHANGE UP (ref 13–44)
MCHC RBC-ENTMCNC: 30.2 PG — SIGNIFICANT CHANGE UP (ref 27–34)
MCHC RBC-ENTMCNC: 33.8 GM/DL — SIGNIFICANT CHANGE UP (ref 32–36)
MCV RBC AUTO: 89.5 FL — SIGNIFICANT CHANGE UP (ref 80–100)
MONOCYTES # BLD AUTO: 1.29 K/UL — HIGH (ref 0–0.9)
MONOCYTES NFR BLD AUTO: 12.3 % — SIGNIFICANT CHANGE UP (ref 2–14)
NEUTROPHILS # BLD AUTO: 5.33 K/UL — SIGNIFICANT CHANGE UP (ref 1.8–7.4)
NEUTROPHILS NFR BLD AUTO: 51 % — SIGNIFICANT CHANGE UP (ref 43–77)
NRBC # BLD: 0 /100 WBCS — SIGNIFICANT CHANGE UP (ref 0–0)
PLATELET # BLD AUTO: 225 K/UL — SIGNIFICANT CHANGE UP (ref 150–400)
POTASSIUM SERPL-MCNC: 4.3 MMOL/L — SIGNIFICANT CHANGE UP (ref 3.5–5.3)
POTASSIUM SERPL-SCNC: 4.3 MMOL/L — SIGNIFICANT CHANGE UP (ref 3.5–5.3)
PROTHROM AB SERPL-ACNC: 13.2 SEC — SIGNIFICANT CHANGE UP (ref 10.6–13.6)
RBC # BLD: 4.86 M/UL — SIGNIFICANT CHANGE UP (ref 4.2–5.8)
RBC # FLD: 14.1 % — SIGNIFICANT CHANGE UP (ref 10.3–14.5)
SODIUM SERPL-SCNC: 140 MMOL/L — SIGNIFICANT CHANGE UP (ref 135–145)
WBC # BLD: 10.45 K/UL — SIGNIFICANT CHANGE UP (ref 3.8–10.5)
WBC # FLD AUTO: 10.45 K/UL — SIGNIFICANT CHANGE UP (ref 3.8–10.5)

## 2021-03-26 PROCEDURE — 93010 ELECTROCARDIOGRAM REPORT: CPT

## 2021-03-26 NOTE — H&P PST ADULT - GASTROINTESTINAL DETAILS
normal/soft/nontender/no distention/bowel sounds normal/no rebound tenderness/no guarding/no rigidity/no organomegaly

## 2021-03-26 NOTE — H&P PST ADULT - NSICDXPASTSURGICALHX_GEN_ALL_CORE_FT
PAST SURGICAL HISTORY:  H/O splenectomy 1999 s/p injury    H/O ventral hernia repair 1999 with mesh    S/P endoscopy 2/19

## 2021-03-26 NOTE — H&P PST ADULT - HISTORY OF PRESENT ILLNESS
72 yo male , pmh- htn , one episode of ITP required immunoglobulin and platelet transfusion  - had hernia mesh in 1990's now with abdominal pain - evaluated by general surgeons - hernia mesh needs to be removed and replaced     denies recent travels in the past 30 days. No fever, SOB, cough, flu like symptoms or body rash- covid screen

## 2021-03-26 NOTE — H&P PST ADULT - NSICDXPASTMEDICALHX_GEN_ALL_CORE_FT
PAST MEDICAL HISTORY:  Benign neoplasm of stomach     Fall with injury 1999 s/p multiple rib fractures, spleen injury    History of ITP 2017  single episode    HTN (hypertension)     Hyperlipidemia

## 2021-03-26 NOTE — H&P PST ADULT - ASSESSMENT
ventral hernia  CAPRINI SCORE    AGE RELATED RISK FACTORS                                                       MOBILITY RELATED FACTORS  [ ] Age 41-60 years                                            (1 Point)                  [ ] Bed rest                                                        (1 Point)  [x ] Age: 61-74 years                                           (2 Points)                [ ] Plaster cast                                                   (2 Points)  [ ] Age= 75 years                                              (3 Points)                 [ ] Bed bound for more than 72 hours                   (2 Points)    DISEASE RELATED RISK FACTORS                                               GENDER SPECIFIC FACTORS  [ ] Edema in the lower extremities                       (1 Point)                  [ ] Pregnancy                                                     (1 Point)  [ ] Varicose veins                                               (1 Point)                  [ ] Post-partum < 6 weeks                                   (1 Point)             [x ] BMI > 25 Kg/m2                                            (1 Point)                  [ ] Hormonal therapy  or oral contraception            (1 Point)                 [ ] Sepsis (in the previous month)                        (1 Point)                  [ ] History of pregnancy complications  [ ] Pneumonia or serious lung disease                                               [ ] Unexplained or recurrent                       (1 Point)           (in the previous month)                               (1 Point)  [ ] Abnormal pulmonary function test                     (1 Point)                 SURGERY RELATED RISK FACTORS  [ ] Acute myocardial infarction                              (1 Point)                 [ ]  Section                                            (1 Point)  [ ] Congestive heart failure (in the previous month)  (1 Point)                 [ ] Minor surgery                                                 (1 Point)   [ ] Inflammatory bowel disease                             (1 Point)                 [ ] Arthroscopic surgery                                        (2 Points)  [ ] Central venous access                                    (2 Points)                [ x] General surgery lasting more than 45 minutes   (2 Points)       [ ] Stroke (in the previous month)                          (5 Points)               [ ] Elective arthroplasty                                        (5 Points)                                                                                                                                               HEMATOLOGY RELATED FACTORS                                                 TRAUMA RELATED RISK FACTORS  [ ] Prior episodes of VTE                                     (3 Points)                 [ ] Fracture of the hip, pelvis, or leg                       (5 Points)  [ ] Positive family history for VTE                         (3 Points)                 [ ] Acute spinal cord injury (in the previous month)  (5 Points)  [ ] Prothrombin 02275 A                                      (3 Points)                 [ ] Paralysis  (less than 1 month)                          (5 Points)  [ ] Factor V Leiden                                             (3 Points)                 [ ] Multiple Trauma within 1 month                         (5 Points)  [ ] Lupus anticoagulants                                     (3 Points)                                                           [ ] Anticardiolipin antibodies                                (3 Points)                                                       [ ] High homocysteine in the blood                      (3 Points)                                             [ ] Other congenital or acquired thrombophilia       (3 Points)                                                [ ] Heparin induced thrombocytopenia                  (3 Points)                                          Total Score [        7  ]  Caprini Score 0-2: Low risk, No VTE Prophylaxis required for most patient's, encourage ambulation  Caprini Score 3-6: At Risk, Pharmacologic VTE prophylaxis is indicated for most patients ( in the absence of a contraindication)  Caprini Score Greater than or = 7: High Risk , pharmacologic VTE is indicated for most patients ( in the absence of a contraindication)    Caprini score indicates that the patient is high risk for VTE event ( score 6 or greater). Surgical patient's in this group will benefit from both pharmacologic prophylaxis and intermittent compression devices . Surgical team will determine the balance between VTE  risk and bleeding risk and other clinical considerations

## 2021-03-26 NOTE — H&P PST ADULT - NSICDXPROBLEM_GEN_ALL_CORE_FT
PROBLEM DIAGNOSES  Problem: Ventral incisional hernia  Assessment and Plan: scheduled for eploraotory laparotomy, explantation of mesh and ventral hernia repair with mesh    Problem: Preoperative examination  Assessment and Plan: labs - cbc,pt/ptt,bmp,t&s,nose cx,ekg  M/C required  preop 3 day hibiclens instruction reviewed and given .instructed on if  nose cx positive use mupuricin 5 days and checklist given  take routine meds DOS with sips of water. avoid NSAID and OTC supplements. verbalized understanding  information on proper nutrition , increase protein and better food choices provided in packet  patient instructed on having covid19 swab 3 days prior to surgery      Problem: HTN (hypertension)  Assessment and Plan: continue meds

## 2021-03-27 LAB
A1C WITH ESTIMATED AVERAGE GLUCOSE RESULT: 5.5 % — SIGNIFICANT CHANGE UP (ref 4–5.6)
ESTIMATED AVERAGE GLUCOSE: 111 MG/DL — SIGNIFICANT CHANGE UP (ref 68–114)
MRSA PCR RESULT.: SIGNIFICANT CHANGE UP
S AUREUS DNA NOSE QL NAA+PROBE: SIGNIFICANT CHANGE UP

## 2021-04-04 ENCOUNTER — APPOINTMENT (OUTPATIENT)
Dept: DISASTER EMERGENCY | Facility: CLINIC | Age: 71
End: 2021-04-04

## 2021-04-04 DIAGNOSIS — Z01.818 ENCOUNTER FOR OTHER PREPROCEDURAL EXAMINATION: ICD-10-CM

## 2021-04-05 LAB — SARS-COV-2 N GENE NPH QL NAA+PROBE: NOT DETECTED

## 2021-04-06 ENCOUNTER — TRANSCRIPTION ENCOUNTER (OUTPATIENT)
Age: 71
End: 2021-04-06

## 2021-04-07 ENCOUNTER — INPATIENT (INPATIENT)
Facility: HOSPITAL | Age: 71
LOS: 4 days | Discharge: HOME | End: 2021-04-12
Attending: SURGERY | Admitting: SURGERY
Payer: MEDICARE

## 2021-04-07 ENCOUNTER — APPOINTMENT (OUTPATIENT)
Dept: SURGERY | Facility: HOSPITAL | Age: 71
End: 2021-04-07

## 2021-04-07 ENCOUNTER — RESULT REVIEW (OUTPATIENT)
Age: 71
End: 2021-04-07

## 2021-04-07 VITALS
TEMPERATURE: 98 F | RESPIRATION RATE: 18 BRPM | OXYGEN SATURATION: 99 % | WEIGHT: 192.02 LBS | HEART RATE: 77 BPM | DIASTOLIC BLOOD PRESSURE: 76 MMHG | HEIGHT: 69 IN | SYSTOLIC BLOOD PRESSURE: 142 MMHG

## 2021-04-07 DIAGNOSIS — Z90.81 ACQUIRED ABSENCE OF SPLEEN: Chronic | ICD-10-CM

## 2021-04-07 DIAGNOSIS — Z98.890 OTHER SPECIFIED POSTPROCEDURAL STATES: Chronic | ICD-10-CM

## 2021-04-07 PROCEDURE — 49565: CPT | Mod: AS

## 2021-04-07 PROCEDURE — 49565: CPT

## 2021-04-07 PROCEDURE — 88300 SURGICAL PATH GROSS: CPT | Mod: 26

## 2021-04-07 PROCEDURE — 15734 MUSCLE-SKIN GRAFT TRUNK: CPT

## 2021-04-07 RX ORDER — ACETAMINOPHEN 500 MG
1000 TABLET ORAL ONCE
Refills: 0 | Status: COMPLETED | OUTPATIENT
Start: 2021-04-08 | End: 2021-04-08

## 2021-04-07 RX ORDER — HYDROMORPHONE HYDROCHLORIDE 2 MG/ML
1 INJECTION INTRAMUSCULAR; INTRAVENOUS; SUBCUTANEOUS
Refills: 0 | Status: DISCONTINUED | OUTPATIENT
Start: 2021-04-07 | End: 2021-04-07

## 2021-04-07 RX ORDER — CYCLOBENZAPRINE HYDROCHLORIDE 10 MG/1
10 TABLET, FILM COATED ORAL THREE TIMES A DAY
Refills: 0 | Status: DISCONTINUED | OUTPATIENT
Start: 2021-04-07 | End: 2021-04-12

## 2021-04-07 RX ORDER — SODIUM CHLORIDE 9 MG/ML
3 INJECTION INTRAMUSCULAR; INTRAVENOUS; SUBCUTANEOUS EVERY 8 HOURS
Refills: 0 | Status: DISCONTINUED | OUTPATIENT
Start: 2021-04-07 | End: 2021-04-07

## 2021-04-07 RX ORDER — HYDROMORPHONE HYDROCHLORIDE 2 MG/ML
0.5 INJECTION INTRAMUSCULAR; INTRAVENOUS; SUBCUTANEOUS
Refills: 0 | Status: DISCONTINUED | OUTPATIENT
Start: 2021-04-07 | End: 2021-04-07

## 2021-04-07 RX ORDER — CEFAZOLIN SODIUM 1 G
2000 VIAL (EA) INJECTION EVERY 8 HOURS
Refills: 0 | Status: COMPLETED | OUTPATIENT
Start: 2021-04-07 | End: 2021-04-08

## 2021-04-07 RX ORDER — HEPARIN SODIUM 5000 [USP'U]/ML
5000 INJECTION INTRAVENOUS; SUBCUTANEOUS EVERY 12 HOURS
Refills: 0 | Status: DISCONTINUED | OUTPATIENT
Start: 2021-04-07 | End: 2021-04-12

## 2021-04-07 RX ORDER — OXYCODONE HYDROCHLORIDE 5 MG/1
10 TABLET ORAL EVERY 4 HOURS
Refills: 0 | Status: DISCONTINUED | OUTPATIENT
Start: 2021-04-07 | End: 2021-04-12

## 2021-04-07 RX ORDER — ACETAMINOPHEN 500 MG
1000 TABLET ORAL ONCE
Refills: 0 | Status: COMPLETED | OUTPATIENT
Start: 2021-04-07 | End: 2021-04-07

## 2021-04-07 RX ORDER — METOCLOPRAMIDE HCL 10 MG
10 TABLET ORAL ONCE
Refills: 0 | Status: DISCONTINUED | OUTPATIENT
Start: 2021-04-07 | End: 2021-04-07

## 2021-04-07 RX ORDER — SODIUM CHLORIDE 9 MG/ML
1000 INJECTION, SOLUTION INTRAVENOUS
Refills: 0 | Status: DISCONTINUED | OUTPATIENT
Start: 2021-04-07 | End: 2021-04-07

## 2021-04-07 RX ORDER — VALSARTAN 80 MG/1
160 TABLET ORAL DAILY
Refills: 0 | Status: DISCONTINUED | OUTPATIENT
Start: 2021-04-07 | End: 2021-04-12

## 2021-04-07 RX ORDER — OXYCODONE HYDROCHLORIDE 5 MG/1
5 TABLET ORAL EVERY 4 HOURS
Refills: 0 | Status: DISCONTINUED | OUTPATIENT
Start: 2021-04-07 | End: 2021-04-12

## 2021-04-07 RX ADMIN — HYDROMORPHONE HYDROCHLORIDE 0.5 MILLIGRAM(S): 2 INJECTION INTRAMUSCULAR; INTRAVENOUS; SUBCUTANEOUS at 17:46

## 2021-04-07 RX ADMIN — Medication 400 MILLIGRAM(S): at 20:41

## 2021-04-07 RX ADMIN — HYDROMORPHONE HYDROCHLORIDE 0.5 MILLIGRAM(S): 2 INJECTION INTRAMUSCULAR; INTRAVENOUS; SUBCUTANEOUS at 17:22

## 2021-04-07 RX ADMIN — HYDROMORPHONE HYDROCHLORIDE 0.5 MILLIGRAM(S): 2 INJECTION INTRAMUSCULAR; INTRAVENOUS; SUBCUTANEOUS at 18:07

## 2021-04-07 RX ADMIN — HEPARIN SODIUM 5000 UNIT(S): 5000 INJECTION INTRAVENOUS; SUBCUTANEOUS at 21:59

## 2021-04-07 RX ADMIN — Medication 100 MILLIGRAM(S): at 21:56

## 2021-04-07 RX ADMIN — CYCLOBENZAPRINE HYDROCHLORIDE 10 MILLIGRAM(S): 10 TABLET, FILM COATED ORAL at 22:00

## 2021-04-07 RX ADMIN — SODIUM CHLORIDE 100 MILLILITER(S): 9 INJECTION, SOLUTION INTRAVENOUS at 17:23

## 2021-04-07 RX ADMIN — HYDROMORPHONE HYDROCHLORIDE 0.5 MILLIGRAM(S): 2 INJECTION INTRAMUSCULAR; INTRAVENOUS; SUBCUTANEOUS at 17:37

## 2021-04-07 RX ADMIN — HYDROMORPHONE HYDROCHLORIDE 0.5 MILLIGRAM(S): 2 INJECTION INTRAMUSCULAR; INTRAVENOUS; SUBCUTANEOUS at 18:16

## 2021-04-07 RX ADMIN — HYDROMORPHONE HYDROCHLORIDE 0.5 MILLIGRAM(S): 2 INJECTION INTRAMUSCULAR; INTRAVENOUS; SUBCUTANEOUS at 17:57

## 2021-04-07 RX ADMIN — Medication 1000 MILLIGRAM(S): at 22:11

## 2021-04-07 NOTE — BRIEF OPERATIVE NOTE - NSICDXBRIEFPROCEDURE_GEN_ALL_CORE_FT
PROCEDURES:  Exploratory laparotomy 07-Apr-2021 17:16:27  Rosa Guzman  Lysis of peritoneal adhesions 07-Apr-2021 17:16:59  Rosa Guzman  Removal of abdominal mesh 07-Apr-2021 17:17:31  Rosa Guzman  Repair, hernia, ventral, with mesh 07-Apr-2021 17:18:01  Rosa Guzman

## 2021-04-07 NOTE — PROGRESS NOTE ADULT - SUBJECTIVE AND OBJECTIVE BOX
72 y/o M with PMH HTN POD #0 exploratory laparotomy ventral hernia repair with mesh secondary to symptomatic recurrent ventral hernia.    Pain well-controlled.  ELYSIA sanguinous output noted.  Denies voiding, flatus, and BM.  Has not yet ambulated.  Denies dyspnea, chest pain, nausea, vomiting.  Pt has tolerated water.  Pt has not eaten yet.      T(F): 97.5 (04-07-21 @ 20:15), Max: 99.2 (04-07-21 @ 17:11)  HR: 73 (04-07-21 @ 20:15) (73 - 83)  BP: 133/74 (04-07-21 @ 20:15) (126/69 - 142/76)  RR: 16 (04-07-21 @ 20:15) (15 - 20)  SpO2: 95% (04-07-21 @ 20:15) (94% - 99%)      PHYSICAL EXAM:  General: NAD.  Cardiac: S1/S2, RRR. No murmurs, rubs or gallops.  Pulmonary: CTA b/l. No rhonchi, rales or wheezing.  Abdomen: Soft, mildly distended. Hypoactive bowel sounds. Appropriate incision tenderness.  Neuro:  AOx3. Non-focal.      LABS:              I&O:         Impression:      PMH   HTN (hypertension)    Hyperlipidemia    Fall with injury    History of ITP    Benign neoplasm of stomach    Assessment: 72 y/o M with PMH HTN POD #0 exploratory laparotomy ventral hernia repair with mesh.    Plan:  -continue VTE prophylaxis   -educated on proper incentive spirometry use  -continue Provena wound vac  -continue Ancef x24 hours  -diet ADAT  -Tylenol and Oxycodone prn for pain  -f/u AM labs POSTOP CHECK    70 y/o M with PMH HTN POD #0 exploratory laparotomy ventral hernia repair with mesh secondary to symptomatic recurrent ventral hernia.    Pain well-controlled.  ELYSIA sanguinous output noted.  Denies voiding, flatus, and BM.  Has not yet ambulated.  Denies dyspnea, chest pain, nausea, vomiting.  Pt has tolerated water.  Pt has not eaten yet.      T(F): 97.5 (04-07-21 @ 20:15), Max: 99.2 (04-07-21 @ 17:11)  HR: 73 (04-07-21 @ 20:15) (73 - 83)  BP: 133/74 (04-07-21 @ 20:15) (126/69 - 142/76)  RR: 16 (04-07-21 @ 20:15) (15 - 20)  SpO2: 95% (04-07-21 @ 20:15) (94% - 99%)      PHYSICAL EXAM:  General: NAD.  Cardiac: S1/S2, RRR. No murmurs, rubs or gallops.  Pulmonary: CTA b/l. No rhonchi, rales or wheezing.  Abdomen: Midline prevena dressing in place with good suction. ELYSIA in place to suction with sanguinous output noted. Mildly distended, hypoactive bowel sounds, soft, appropriate incision tenderness. No rebound/guarding.  Neuro:  AOx3. Non-focal.        Assessment: 70 y/o M with PMH HTN POD #0 exploratory laparotomy ventral hernia repair with mesh.    Plan:  -continue VTE prophylaxis   -educated on proper incentive spirometry use  -continue Provena wound vac  -Monitor and record ELYSIA output   -continue Ancef x24 hours  -diet ADAT  -Tylenol and Oxycodone prn for pain  -oob/activity as tolerated     Will d/w attending   -f/u AM labs POSTOP CHECK - S/p exploratory laparotomy ventral hernia repair with mesh POD#0    Patient seen and examined at bedside, resting comfortably. Denies any pain at this time. Has not yet ambulated or been oob. No flatus/BM yet.   Tolerating sip of clears. No nausea or vomiting.  Denies fevers, chills, chest pain, dyspnea, cough.      T(F): 97.5 (04-07-21 @ 20:15), Max: 99.2 (04-07-21 @ 17:11)  HR: 73 (04-07-21 @ 20:15) (73 - 83)  BP: 133/74 (04-07-21 @ 20:15) (126/69 - 142/76)  RR: 16 (04-07-21 @ 20:15) (15 - 20)  SpO2: 95% (04-07-21 @ 20:15) (94% - 99%)      PHYSICAL EXAM:  General: NAD.  Cardiac: S1/S2, RRR. No murmurs, rubs or gallops.  Pulmonary: CTA b/l. No rhonchi, rales or wheezing.  Abdomen: Midline prevena dressing in place with good suction. ELYSIA in place to suction with sanguinous output noted. Mildly distended, hypoactive bowel sounds, soft, appropriate incision tenderness. No rebound/guarding.  Neuro:  AOx3. Non-focal.        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 Keep to suction.  - Advance diet as tolerated   - Ancef x24 hrs   - Pain control prn   - oob/activity as tolerated, encouraged IS use   - DVT ppx     Will d/w attending

## 2021-04-08 ENCOUNTER — TRANSCRIPTION ENCOUNTER (OUTPATIENT)
Age: 71
End: 2021-04-08

## 2021-04-08 LAB
ANION GAP SERPL CALC-SCNC: 4 MMOL/L — LOW (ref 5–17)
BUN SERPL-MCNC: 20 MG/DL — SIGNIFICANT CHANGE UP (ref 7–23)
CALCIUM SERPL-MCNC: 8.9 MG/DL — SIGNIFICANT CHANGE UP (ref 8.5–10.1)
CHLORIDE SERPL-SCNC: 104 MMOL/L — SIGNIFICANT CHANGE UP (ref 96–108)
CO2 SERPL-SCNC: 31 MMOL/L — SIGNIFICANT CHANGE UP (ref 22–31)
COVID-19 SPIKE DOMAIN AB INTERP: POSITIVE
COVID-19 SPIKE DOMAIN ANTIBODY RESULT: >250 U/ML — HIGH
CREAT SERPL-MCNC: 1.1 MG/DL — SIGNIFICANT CHANGE UP (ref 0.5–1.3)
GLUCOSE BLDC GLUCOMTR-MCNC: 102 MG/DL — HIGH (ref 70–99)
GLUCOSE SERPL-MCNC: 116 MG/DL — HIGH (ref 70–99)
HCT VFR BLD CALC: 42.4 % — SIGNIFICANT CHANGE UP (ref 39–50)
HGB BLD-MCNC: 13.7 G/DL — SIGNIFICANT CHANGE UP (ref 13–17)
MAGNESIUM SERPL-MCNC: 2 MG/DL — SIGNIFICANT CHANGE UP (ref 1.6–2.6)
MCHC RBC-ENTMCNC: 30 PG — SIGNIFICANT CHANGE UP (ref 27–34)
MCHC RBC-ENTMCNC: 32.3 GM/DL — SIGNIFICANT CHANGE UP (ref 32–36)
MCV RBC AUTO: 93 FL — SIGNIFICANT CHANGE UP (ref 80–100)
NRBC # BLD: 0 /100 WBCS — SIGNIFICANT CHANGE UP (ref 0–0)
PHOSPHATE SERPL-MCNC: 3.2 MG/DL — SIGNIFICANT CHANGE UP (ref 2.5–4.5)
PLATELET # BLD AUTO: 220 K/UL — SIGNIFICANT CHANGE UP (ref 150–400)
POTASSIUM SERPL-MCNC: 4.7 MMOL/L — SIGNIFICANT CHANGE UP (ref 3.5–5.3)
POTASSIUM SERPL-SCNC: 4.7 MMOL/L — SIGNIFICANT CHANGE UP (ref 3.5–5.3)
RBC # BLD: 4.56 M/UL — SIGNIFICANT CHANGE UP (ref 4.2–5.8)
RBC # FLD: 14.2 % — SIGNIFICANT CHANGE UP (ref 10.3–14.5)
SARS-COV-2 IGG+IGM SERPL QL IA: >250 U/ML — HIGH
SARS-COV-2 IGG+IGM SERPL QL IA: POSITIVE
SODIUM SERPL-SCNC: 139 MMOL/L — SIGNIFICANT CHANGE UP (ref 135–145)
WBC # BLD: 20.13 K/UL — HIGH (ref 3.8–10.5)
WBC # FLD AUTO: 20.13 K/UL — HIGH (ref 3.8–10.5)

## 2021-04-08 RX ORDER — OXYCODONE HYDROCHLORIDE 5 MG/1
1 TABLET ORAL
Qty: 20 | Refills: 0
Start: 2021-04-08

## 2021-04-08 RX ORDER — CYCLOBENZAPRINE HYDROCHLORIDE 10 MG/1
1 TABLET, FILM COATED ORAL
Qty: 20 | Refills: 0
Start: 2021-04-08

## 2021-04-08 RX ORDER — ACETAMINOPHEN 500 MG
1000 TABLET ORAL ONCE
Refills: 0 | Status: COMPLETED | OUTPATIENT
Start: 2021-04-08 | End: 2021-04-08

## 2021-04-08 RX ADMIN — CYCLOBENZAPRINE HYDROCHLORIDE 10 MILLIGRAM(S): 10 TABLET, FILM COATED ORAL at 13:21

## 2021-04-08 RX ADMIN — CYCLOBENZAPRINE HYDROCHLORIDE 10 MILLIGRAM(S): 10 TABLET, FILM COATED ORAL at 21:33

## 2021-04-08 RX ADMIN — VALSARTAN 160 MILLIGRAM(S): 80 TABLET ORAL at 06:49

## 2021-04-08 RX ADMIN — HEPARIN SODIUM 5000 UNIT(S): 5000 INJECTION INTRAVENOUS; SUBCUTANEOUS at 11:36

## 2021-04-08 RX ADMIN — Medication 1000 MILLIGRAM(S): at 23:29

## 2021-04-08 RX ADMIN — Medication 400 MILLIGRAM(S): at 06:49

## 2021-04-08 RX ADMIN — Medication 400 MILLIGRAM(S): at 23:23

## 2021-04-08 RX ADMIN — Medication 100 MILLIGRAM(S): at 06:21

## 2021-04-08 RX ADMIN — Medication 100 MILLIGRAM(S): at 14:19

## 2021-04-08 RX ADMIN — Medication 400 MILLIGRAM(S): at 14:26

## 2021-04-08 RX ADMIN — CYCLOBENZAPRINE HYDROCHLORIDE 10 MILLIGRAM(S): 10 TABLET, FILM COATED ORAL at 06:26

## 2021-04-08 RX ADMIN — Medication 1000 MILLIGRAM(S): at 15:21

## 2021-04-08 RX ADMIN — HEPARIN SODIUM 5000 UNIT(S): 5000 INJECTION INTRAVENOUS; SUBCUTANEOUS at 21:33

## 2021-04-08 NOTE — DISCHARGE NOTE PROVIDER - CARE PROVIDER_API CALL
Afsaneh Navarro)  Surgery  [address] [place] [address]  Phone: [phone]  Fax: [phone]  Follow Up Time: 2 weeks

## 2021-04-08 NOTE — DISCHARGE NOTE PROVIDER - NSDCFUADDINST_GEN_ALL_CORE_FT
Wound: You may take off outer pink dressing and shower after 48 hours. After showering, pat steri strips dry. Leave the white steri strips in place, they will fall off on their own in approximately 5-7 days or they will be removed at your follow up appointment.  Wound: You may take off gauze dressing to midline wound. You may shower. After showering, pat steri strips dry. Leave the white steri strips in place, they will fall off on their own in approximately 5-7 days or they will be removed at your follow up appointment.     Call your doctor or return to ED if you experience worsening abdominal pain, vomiting, chest pain, shortness of breath, drainage or bleeding from surgical sites.     Please monitor drain output daily, noting the amount and color of fluid. Empty the contents of the bulb each day. Strip the drain as instructed to ensure the tubing is draining properly. Drain will be removed at your follow up visit.

## 2021-04-08 NOTE — DISCHARGE NOTE PROVIDER - HOSPITAL COURSE
70 yo male , pmh- htn , one episode of ITP required immunoglobulin and platelet transfusion  - had hernia mesh in 1990's now with abdominal pain - evaluated by general surgeons - hernia mesh needs to be removed and replaced. Pt underwent a scheduled ex lap, LONNY, excision of mesh and ventral hernia repair with mesh on 4/7/20121. Pt tolerated the procedure well. He was admitted for pain control and observation.   Pt currently tolerating a regular diet, ambulating, voiding and pain controlled. He was instructed to follow up with Dr. Navarro in 1-2 weeks.    70 yo male , pmh- htn , one episode of ITP required immunoglobulin and platelet transfusion  - had hernia mesh in 1990's now with abdominal pain - evaluated by general surgeons - hernia mesh needs to be removed and replaced. Pt underwent a scheduled ex lap, LONNY, excision of mesh and ventral hernia repair with mesh on 4/7/20121. Pt tolerated the procedure well. He was admitted for pain control and observation.   Pt currently tolerating a regular diet, voiding and pain controlled. Patient ambulating without pain or difficulty (no PT needs). He was instructed to follow up with Dr. Navarro in 1-2 weeks.

## 2021-04-08 NOTE — DISCHARGE NOTE PROVIDER - NSDCMRMEDTOKEN_GEN_ALL_CORE_FT
acetaminophen 500 mg oral tablet: 2 tab(s) orally every 6 hours  cyclobenzaprine 5 mg oral tablet: 1 tab(s) orally 3 times a day as needed for muscle spasms MDD:3  Motrin 600 mg oral tablet: 1 tab(s) orally every 6 hours  Stagger with tylenol.  oxyCODONE 5 mg oral tablet: 1-2 tab(s) orally every 4-6 hours, As needed, Moderate to Severe Pain MDD:8  rosuvastatin 10 mg oral tablet: 1 tab(s) orally once a day  valsartan 160 mg oral tablet: 1 tab(s) orally once a day

## 2021-04-08 NOTE — DISCHARGE NOTE PROVIDER - NSDCFUADDAPPT_GEN_ALL_CORE_FT
Please follow up with your primary care physician regarding your hospitalization. Please schedule an appointment with your primary care provider within two weeks after your discharge to review your hospital course.

## 2021-04-08 NOTE — DISCHARGE NOTE PROVIDER - NSDCCPTREATMENT_GEN_ALL_CORE_FT
PRINCIPAL PROCEDURE  Procedure: Exploratory laparotomy  Findings and Treatment:       SECONDARY PROCEDURE  Procedure: Repair, hernia, ventral, with mesh  Findings and Treatment:     Procedure: Removal of abdominal mesh  Findings and Treatment:     Procedure: Lysis of peritoneal adhesions  Findings and Treatment:

## 2021-04-08 NOTE — DISCHARGE NOTE PROVIDER - NSDCCPCAREPLAN_GEN_ALL_CORE_FT
PRINCIPAL DISCHARGE DIAGNOSIS  Diagnosis: Recurrent ventral hernia  Assessment and Plan of Treatment: Follow up with Dr. Navarro in 1-2 weeks. Please call to schedule an appointment.   NOTIFY YOUR SURGEON IF: You have any bleeding that does not stop, any pus draining from your wound, any fever (over 100.4 F) or chills, persistent nausea/vomiting, persistent diarrhea, or if your pain is not controlled on your discharge pain medications.

## 2021-04-08 NOTE — PROGRESS NOTE ADULT - SUBJECTIVE AND OBJECTIVE BOX
POD#1 s/p exploratory laparotomy, extensive lysis of adhesions, excision of mesh and ventral hernia repair with mesh     Patient seen and examined bedside resting comfortably. No complaints offered. Pain well controlled on current regimen. 1 episode of flatus overnight. No BM yet.  Tolerating clear liquids so far. No nausea/vomiting. Voiding.   Denies chest pain, dyspnea, cough.    T(F): 98.1 (04-08-21 @ 06:00), Max: 99.2 (04-07-21 @ 17:11)  HR: 62 (04-08-21 @ 06:00) (62 - 83)  BP: 111/63 (04-08-21 @ 06:00) (108/57 - 142/76)  RR: 17 (04-08-21 @ 06:00) (15 - 20)  SpO2: 95% (04-08-21 @ 06:00) (93% - 99%)  Wt(kg): --  CAPILLARY BLOOD GLUCOSE      PHYSICAL EXAM:  General: NAD, WDWN  Neuro:  Alert & oriented x 3  HEENT: NCAT, EOMI, conjunctiva clear  CV: +S1+S2 regular rate and rhythm  Lung: clear to auscultation bilaterally, respirations nonlabored  Abdomen: Prevena dressing c/d/i with good suction. ELYSIA drain in place with serosanguinous output noted (output 25ccs recorded so far). Non-distended, +BS, soft, appropriate elise-incisional tenderness. No rebound/guarding.  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 Keep to suction.  - Will advance diet this Am tolerated   - Ancef x24 hrs   - Pain control prn   - oob/activity as tolerated, encouraged IS use   - DVT ppx     Will d/w attending

## 2021-04-09 LAB
ANION GAP SERPL CALC-SCNC: 4 MMOL/L — LOW (ref 5–17)
BUN SERPL-MCNC: 24 MG/DL — HIGH (ref 7–23)
CALCIUM SERPL-MCNC: 9 MG/DL — SIGNIFICANT CHANGE UP (ref 8.5–10.1)
CHLORIDE SERPL-SCNC: 102 MMOL/L — SIGNIFICANT CHANGE UP (ref 96–108)
CO2 SERPL-SCNC: 33 MMOL/L — HIGH (ref 22–31)
CREAT SERPL-MCNC: 0.96 MG/DL — SIGNIFICANT CHANGE UP (ref 0.5–1.3)
GLUCOSE SERPL-MCNC: 79 MG/DL — SIGNIFICANT CHANGE UP (ref 70–99)
HCT VFR BLD CALC: 42.7 % — SIGNIFICANT CHANGE UP (ref 39–50)
HGB BLD-MCNC: 13.5 G/DL — SIGNIFICANT CHANGE UP (ref 13–17)
MAGNESIUM SERPL-MCNC: 2.2 MG/DL — SIGNIFICANT CHANGE UP (ref 1.6–2.6)
MCHC RBC-ENTMCNC: 29.5 PG — SIGNIFICANT CHANGE UP (ref 27–34)
MCHC RBC-ENTMCNC: 31.6 GM/DL — LOW (ref 32–36)
MCV RBC AUTO: 93.2 FL — SIGNIFICANT CHANGE UP (ref 80–100)
NRBC # BLD: 0 /100 WBCS — SIGNIFICANT CHANGE UP (ref 0–0)
PHOSPHATE SERPL-MCNC: 2.3 MG/DL — LOW (ref 2.5–4.5)
PLATELET # BLD AUTO: 216 K/UL — SIGNIFICANT CHANGE UP (ref 150–400)
POTASSIUM SERPL-MCNC: 4.6 MMOL/L — SIGNIFICANT CHANGE UP (ref 3.5–5.3)
POTASSIUM SERPL-SCNC: 4.6 MMOL/L — SIGNIFICANT CHANGE UP (ref 3.5–5.3)
RBC # BLD: 4.58 M/UL — SIGNIFICANT CHANGE UP (ref 4.2–5.8)
RBC # FLD: 14.5 % — SIGNIFICANT CHANGE UP (ref 10.3–14.5)
SODIUM SERPL-SCNC: 139 MMOL/L — SIGNIFICANT CHANGE UP (ref 135–145)
SURGICAL PATHOLOGY STUDY: SIGNIFICANT CHANGE UP
WBC # BLD: 18.43 K/UL — HIGH (ref 3.8–10.5)
WBC # FLD AUTO: 18.43 K/UL — HIGH (ref 3.8–10.5)

## 2021-04-09 RX ORDER — ACETAMINOPHEN 500 MG
1000 TABLET ORAL EVERY 6 HOURS
Refills: 0 | Status: DISCONTINUED | OUTPATIENT
Start: 2021-04-09 | End: 2021-04-12

## 2021-04-09 RX ORDER — KETOROLAC TROMETHAMINE 30 MG/ML
15 SYRINGE (ML) INJECTION EVERY 6 HOURS
Refills: 0 | Status: DISCONTINUED | OUTPATIENT
Start: 2021-04-09 | End: 2021-04-10

## 2021-04-09 RX ADMIN — Medication 1000 MILLIGRAM(S): at 18:20

## 2021-04-09 RX ADMIN — Medication 1000 MILLIGRAM(S): at 17:26

## 2021-04-09 RX ADMIN — Medication 15 MILLIGRAM(S): at 16:20

## 2021-04-09 RX ADMIN — Medication 15 MILLIGRAM(S): at 23:08

## 2021-04-09 RX ADMIN — CYCLOBENZAPRINE HYDROCHLORIDE 10 MILLIGRAM(S): 10 TABLET, FILM COATED ORAL at 15:25

## 2021-04-09 RX ADMIN — HEPARIN SODIUM 5000 UNIT(S): 5000 INJECTION INTRAVENOUS; SUBCUTANEOUS at 09:58

## 2021-04-09 RX ADMIN — Medication 1000 MILLIGRAM(S): at 23:07

## 2021-04-09 RX ADMIN — CYCLOBENZAPRINE HYDROCHLORIDE 10 MILLIGRAM(S): 10 TABLET, FILM COATED ORAL at 23:07

## 2021-04-09 RX ADMIN — Medication 15 MILLIGRAM(S): at 23:23

## 2021-04-09 RX ADMIN — CYCLOBENZAPRINE HYDROCHLORIDE 10 MILLIGRAM(S): 10 TABLET, FILM COATED ORAL at 05:58

## 2021-04-09 RX ADMIN — Medication 15 MILLIGRAM(S): at 17:27

## 2021-04-09 RX ADMIN — Medication 15 MILLIGRAM(S): at 15:26

## 2021-04-09 RX ADMIN — Medication 15 MILLIGRAM(S): at 18:20

## 2021-04-09 RX ADMIN — Medication 1000 MILLIGRAM(S): at 16:20

## 2021-04-09 RX ADMIN — VALSARTAN 160 MILLIGRAM(S): 80 TABLET ORAL at 05:58

## 2021-04-09 RX ADMIN — HEPARIN SODIUM 5000 UNIT(S): 5000 INJECTION INTRAVENOUS; SUBCUTANEOUS at 23:07

## 2021-04-09 RX ADMIN — Medication 1000 MILLIGRAM(S): at 15:25

## 2021-04-09 NOTE — PROGRESS NOTE ADULT - SUBJECTIVE AND OBJECTIVE BOX
INTERVAL HPI/OVERNIGHT EVENTS:  Pt. seen and examined at bedside resting comfortably. No acute overnight events. Patient c/o postoperative abdominal pain, well controlled with medications. Denies N/V, tolerating clear liquid diet. States he passed a little flatus yesterday and some while urinating in the bathroom this morning. Denies BM yet. OOB and ambulating.   Denies fever/chills, chest pain, dyspnea, cough, dizziness.     Vital Signs Last 24 Hrs  T(C): 36.7 (09 Apr 2021 00:09), Max: 36.7 (08 Apr 2021 06:00)  T(F): 98.1 (09 Apr 2021 00:09), Max: 98.1 (08 Apr 2021 06:00)  HR: 70 (09 Apr 2021 00:09) (62 - 71)  BP: 128/73 (09 Apr 2021 00:09) (111/63 - 128/73)  RR: 18 (09 Apr 2021 00:09) (16 - 18)  SpO2: 93% (09 Apr 2021 00:09) (92% - 95%)    PHYSICAL EXAM:  General: NAD, WDWN  Neuro:  Alert & oriented x 3  HEENT: NCAT, EOMI, conjunctiva clear  CV: +S1+S2 regular rate and rhythm  Lung: clear to auscultation bilaterally, respirations nonlabored  Abdomen: Prevena dressing c/d/i with good suction. ELYSIA drain in place with sanguinous output. Non-distended, +BS, soft, appropriate elise-incisional tenderness. No rebound/guarding.  Extremities: no pedal edema or calf tenderness noted       I&O's Detail    07 Apr 2021 07:01  -  08 Apr 2021 07:00  --------------------------------------------------------  IN:    IV PiggyBack: 300 mL    Lactated Ringers: 200 mL  Total IN: 500 mL    OUT:    Bulb (mL): 85 mL    Voided (mL): 821 mL  Total OUT: 906 mL    Total NET: -406 mL      08 Apr 2021 07:01  -  09 Apr 2021 05:58  --------------------------------------------------------  IN:  Total IN: 0 mL    OUT:    Bulb (mL): 45 mL  Total OUT: 45 mL    Total NET: -45 mL      LABS:                        13.7   20.13 )-----------( 220      ( 08 Apr 2021 08:53 )             42.4     04-08    139  |  104  |  20  ----------------------------<  116<H>  4.7   |  31  |  1.10    Ca    8.9      08 Apr 2021 08:53  Phos  3.2     04-08  Mg     2.0     04-08      Impression:  72 y/o M with PMH HTN, HLD, 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 +flatus.     Plan:  - Continue clear liquids for now, likely will advance diet later today  - Continue local wound care, prevena dressing in place   - Monitor and record ELYSIA output. Keep to suction.  - Pain control prn   - oob/activity as tolerated, encouraged IS use   - DVT ppx   - F/u AM labs  - D/C planning once able to tolerate regular diet   - Will d/w Dr. Navarro

## 2021-04-09 NOTE — PHYSICAL THERAPY INITIAL EVALUATION ADULT - BALANCE TRAINING, PT EVAL
Patient will be normal in static and dynamic standing balance without device in 5-7 days to improve safety and decrease risk of falls.

## 2021-04-09 NOTE — PHYSICAL THERAPY INITIAL EVALUATION ADULT - GAIT TRAINING, PT EVAL
Patient will ambulate 500 feet without device independently for community ambulation in 5-7 days. Patient will ascend/descend 1 step with R rail up/L rail down independently in 2-3 days to safely navigate home environment.

## 2021-04-09 NOTE — PHYSICAL THERAPY INITIAL EVALUATION ADULT - ADDITIONAL COMMENTS
As per patient, he lives in a house with 1 step to enter, +rail, no steps once inside. Patient reports he was independent prior to surgery, denies use of assistive device.

## 2021-04-10 LAB
ANION GAP SERPL CALC-SCNC: 5 MMOL/L — SIGNIFICANT CHANGE UP (ref 5–17)
BUN SERPL-MCNC: 22 MG/DL — SIGNIFICANT CHANGE UP (ref 7–23)
CALCIUM SERPL-MCNC: 8.6 MG/DL — SIGNIFICANT CHANGE UP (ref 8.5–10.1)
CHLORIDE SERPL-SCNC: 104 MMOL/L — SIGNIFICANT CHANGE UP (ref 96–108)
CO2 SERPL-SCNC: 29 MMOL/L — SIGNIFICANT CHANGE UP (ref 22–31)
CREAT SERPL-MCNC: 0.79 MG/DL — SIGNIFICANT CHANGE UP (ref 0.5–1.3)
GLUCOSE SERPL-MCNC: 78 MG/DL — SIGNIFICANT CHANGE UP (ref 70–99)
HCT VFR BLD CALC: 42 % — SIGNIFICANT CHANGE UP (ref 39–50)
HGB BLD-MCNC: 13.5 G/DL — SIGNIFICANT CHANGE UP (ref 13–17)
MAGNESIUM SERPL-MCNC: 2.1 MG/DL — SIGNIFICANT CHANGE UP (ref 1.6–2.6)
MCHC RBC-ENTMCNC: 29.7 PG — SIGNIFICANT CHANGE UP (ref 27–34)
MCHC RBC-ENTMCNC: 32.1 GM/DL — SIGNIFICANT CHANGE UP (ref 32–36)
MCV RBC AUTO: 92.5 FL — SIGNIFICANT CHANGE UP (ref 80–100)
NRBC # BLD: 0 /100 WBCS — SIGNIFICANT CHANGE UP (ref 0–0)
PHOSPHATE SERPL-MCNC: 2.8 MG/DL — SIGNIFICANT CHANGE UP (ref 2.5–4.5)
PLATELET # BLD AUTO: 215 K/UL — SIGNIFICANT CHANGE UP (ref 150–400)
POTASSIUM SERPL-MCNC: 4.2 MMOL/L — SIGNIFICANT CHANGE UP (ref 3.5–5.3)
POTASSIUM SERPL-SCNC: 4.2 MMOL/L — SIGNIFICANT CHANGE UP (ref 3.5–5.3)
RBC # BLD: 4.54 M/UL — SIGNIFICANT CHANGE UP (ref 4.2–5.8)
RBC # FLD: 14.3 % — SIGNIFICANT CHANGE UP (ref 10.3–14.5)
SODIUM SERPL-SCNC: 138 MMOL/L — SIGNIFICANT CHANGE UP (ref 135–145)
WBC # BLD: 15.45 K/UL — HIGH (ref 3.8–10.5)
WBC # FLD AUTO: 15.45 K/UL — HIGH (ref 3.8–10.5)

## 2021-04-10 RX ORDER — MAGNESIUM HYDROXIDE 400 MG/1
30 TABLET, CHEWABLE ORAL DAILY
Refills: 0 | Status: DISCONTINUED | OUTPATIENT
Start: 2021-04-10 | End: 2021-04-12

## 2021-04-10 RX ADMIN — OXYCODONE HYDROCHLORIDE 10 MILLIGRAM(S): 5 TABLET ORAL at 21:45

## 2021-04-10 RX ADMIN — Medication 15 MILLIGRAM(S): at 05:47

## 2021-04-10 RX ADMIN — HEPARIN SODIUM 5000 UNIT(S): 5000 INJECTION INTRAVENOUS; SUBCUTANEOUS at 21:44

## 2021-04-10 RX ADMIN — MAGNESIUM HYDROXIDE 30 MILLILITER(S): 400 TABLET, CHEWABLE ORAL at 22:42

## 2021-04-10 RX ADMIN — VALSARTAN 160 MILLIGRAM(S): 80 TABLET ORAL at 05:32

## 2021-04-10 RX ADMIN — Medication 15 MILLIGRAM(S): at 05:32

## 2021-04-10 RX ADMIN — Medication 1000 MILLIGRAM(S): at 06:32

## 2021-04-10 RX ADMIN — CYCLOBENZAPRINE HYDROCHLORIDE 10 MILLIGRAM(S): 10 TABLET, FILM COATED ORAL at 05:32

## 2021-04-10 RX ADMIN — OXYCODONE HYDROCHLORIDE 10 MILLIGRAM(S): 5 TABLET ORAL at 22:34

## 2021-04-10 RX ADMIN — CYCLOBENZAPRINE HYDROCHLORIDE 10 MILLIGRAM(S): 10 TABLET, FILM COATED ORAL at 21:44

## 2021-04-10 RX ADMIN — HEPARIN SODIUM 5000 UNIT(S): 5000 INJECTION INTRAVENOUS; SUBCUTANEOUS at 09:39

## 2021-04-10 RX ADMIN — Medication 1000 MILLIGRAM(S): at 05:32

## 2021-04-10 NOTE — PROGRESS NOTE ADULT - SUBJECTIVE AND OBJECTIVE BOX
INTERVAL HPI/OVERNIGHT EVENTS:  Pt. seen and examined at bedside resting comfortably. No acute overnight events. Patient c/o postoperative abdominal pain, well controlled with medications. States he is feeling "better than yesterday," feels less bloated. Denies N/V, tolerating clear liquid diet. +flatus. Denies BM yet. OOB and ambulating, voiding well.   Denies fever/chills, chest pain, dyspnea, cough, dizziness    Vital Signs Last 24 Hrs  T(C): 36.6 (10 Apr 2021 05:03), Max: 36.7 (09 Apr 2021 06:04)  T(F): 97.8 (10 Apr 2021 05:03), Max: 98.1 (09 Apr 2021 11:59)  HR: 66 (10 Apr 2021 05:03) (60 - 82)  BP: 141/78 (10 Apr 2021 05:03) (124/86 - 141/78)  RR: 18 (10 Apr 2021 05:03) (17 - 18)  SpO2: 93% (10 Apr 2021 05:03) (93% - 95%)    PHYSICAL EXAM:  General: NAD, WDWN  Neuro:  Alert & oriented x 3  HEENT: NCAT, EOMI, conjunctiva clear  CV: +S1+S2 regular rate and rhythm  Lung: clear to auscultation bilaterally, respirations nonlabored  Abdomen: Prevena dressing c/d/i with good suction. ELYSIA drain in place with s/s output. Non-distended, +BS, soft, appropriate elise-incisional tenderness. No rebound/guarding.  Extremities: no pedal edema or calf tenderness noted     I&O's Detail    08 Apr 2021 07:01  -  09 Apr 2021 07:00  --------------------------------------------------------  IN:    IV PiggyBack: 100 mL    Oral Fluid: 120 mL  Total IN: 220 mL    OUT:    Bulb (mL): 105 mL    Estimated Blood Loss (mL): 0 mL  Total OUT: 105 mL    Total NET: 115 mL      09 Apr 2021 07:01  -  10 Apr 2021 05:49  --------------------------------------------------------  IN:  Total IN: 0 mL    OUT:    Bulb (mL): 90 mL  Total OUT: 90 mL    Total NET: -90 mL      LABS:                        13.5   18.43 )-----------( 216      ( 09 Apr 2021 07:39 )             42.7     04-09    139  |  102  |  24<H>  ----------------------------<  79  4.6   |  33<H>  |  0.96    Ca    9.0      09 Apr 2021 07:39  Phos  2.3     04-09  Mg     2.2     04-09      Impression:  72 y/o M with PMH HTN, HLD, 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 +flatus.     Plan:  - Advance to regular diet as tolerated  - Continue local wound care, prevena dressing in place   - Monitor and record ELYSIA output. Keep to suction.  - Pain control prn   - oob/activity as tolerated, encouraged IS use   - DVT ppx   - F/u AM labs  - D/C planning once able to tolerate regular diet   - Will d/w Dr. Navarro

## 2021-04-11 LAB
ANION GAP SERPL CALC-SCNC: 6 MMOL/L — SIGNIFICANT CHANGE UP (ref 5–17)
BUN SERPL-MCNC: 27 MG/DL — HIGH (ref 7–23)
CALCIUM SERPL-MCNC: 8.5 MG/DL — SIGNIFICANT CHANGE UP (ref 8.5–10.1)
CHLORIDE SERPL-SCNC: 101 MMOL/L — SIGNIFICANT CHANGE UP (ref 96–108)
CO2 SERPL-SCNC: 30 MMOL/L — SIGNIFICANT CHANGE UP (ref 22–31)
CREAT SERPL-MCNC: 0.96 MG/DL — SIGNIFICANT CHANGE UP (ref 0.5–1.3)
GLUCOSE SERPL-MCNC: 90 MG/DL — SIGNIFICANT CHANGE UP (ref 70–99)
HCT VFR BLD CALC: 39.8 % — SIGNIFICANT CHANGE UP (ref 39–50)
HGB BLD-MCNC: 13.4 G/DL — SIGNIFICANT CHANGE UP (ref 13–17)
MAGNESIUM SERPL-MCNC: 2.2 MG/DL — SIGNIFICANT CHANGE UP (ref 1.6–2.6)
MCHC RBC-ENTMCNC: 29.8 PG — SIGNIFICANT CHANGE UP (ref 27–34)
MCHC RBC-ENTMCNC: 33.7 GM/DL — SIGNIFICANT CHANGE UP (ref 32–36)
MCV RBC AUTO: 88.6 FL — SIGNIFICANT CHANGE UP (ref 80–100)
NRBC # BLD: 0 /100 WBCS — SIGNIFICANT CHANGE UP (ref 0–0)
PHOSPHATE SERPL-MCNC: 3.2 MG/DL — SIGNIFICANT CHANGE UP (ref 2.5–4.5)
PLATELET # BLD AUTO: 238 K/UL — SIGNIFICANT CHANGE UP (ref 150–400)
POTASSIUM SERPL-MCNC: 3.9 MMOL/L — SIGNIFICANT CHANGE UP (ref 3.5–5.3)
POTASSIUM SERPL-SCNC: 3.9 MMOL/L — SIGNIFICANT CHANGE UP (ref 3.5–5.3)
RBC # BLD: 4.49 M/UL — SIGNIFICANT CHANGE UP (ref 4.2–5.8)
RBC # FLD: 14.2 % — SIGNIFICANT CHANGE UP (ref 10.3–14.5)
SODIUM SERPL-SCNC: 137 MMOL/L — SIGNIFICANT CHANGE UP (ref 135–145)
WBC # BLD: 12.6 K/UL — HIGH (ref 3.8–10.5)
WBC # FLD AUTO: 12.6 K/UL — HIGH (ref 3.8–10.5)

## 2021-04-11 RX ADMIN — Medication 10 MILLIGRAM(S): at 08:28

## 2021-04-11 RX ADMIN — CYCLOBENZAPRINE HYDROCHLORIDE 10 MILLIGRAM(S): 10 TABLET, FILM COATED ORAL at 05:35

## 2021-04-11 RX ADMIN — VALSARTAN 160 MILLIGRAM(S): 80 TABLET ORAL at 05:34

## 2021-04-11 RX ADMIN — Medication 10 MILLIGRAM(S): at 05:35

## 2021-04-11 RX ADMIN — HEPARIN SODIUM 5000 UNIT(S): 5000 INJECTION INTRAVENOUS; SUBCUTANEOUS at 20:52

## 2021-04-11 RX ADMIN — OXYCODONE HYDROCHLORIDE 10 MILLIGRAM(S): 5 TABLET ORAL at 07:08

## 2021-04-11 RX ADMIN — HEPARIN SODIUM 5000 UNIT(S): 5000 INJECTION INTRAVENOUS; SUBCUTANEOUS at 11:14

## 2021-04-11 RX ADMIN — OXYCODONE HYDROCHLORIDE 10 MILLIGRAM(S): 5 TABLET ORAL at 05:34

## 2021-04-11 NOTE — PROGRESS NOTE ADULT - SUBJECTIVE AND OBJECTIVE BOX
Patient seen and examined at bedside in no acute distress.  Pt reports his abdominal pain is much improved. C/o persistent abdominal bloating.  Says he passed "little gas" last night, denies flatus this am.   Denies BM since prior to surgery. Magnesium hydroxide given last night. Dulcolax suppository administered this am.   Tolerating regular diet. Denies nausea, vomiting, fever, chills, chest pain, SOB.    Reports being OOB/ambulating yesterday.     Vital Signs Last 24 Hrs  T(F): 98.4 (04-11-21 @ 00:09), Max: 98.4 (04-11-21 @ 00:09)  HR: 80 (04-11-21 @ 05:25)  BP: 127/75 (04-11-21 @ 05:25)  RR: 18 (04-11-21 @ 05:25)  SpO2: 94% (04-11-21 @ 05:25)      GENERAL: Alert, NAD  CHEST/LUNG: Respirations nonlabored  HEART: S1S2, Regular rate and rhythm  ABDOMEN: Normoactive bowel sounds, Prevena dressing intact with adequate suction, ELYSIA drain with 40cc/24 hr serosanguinous. Abdomen soft, Nontender, Nondistended, no rebound or guarding noted.   EXTREMITIES:  no calf tenderness, No edema    I&O's Detail    10 Apr 2021 07:01  -  11 Apr 2021 07:00  --------------------------------------------------------  IN:    Oral Fluid: 480 mL  Total IN: 480 mL    OUT:    Bulb (mL): 20 mL  Total OUT: 20 mL    Total NET: 460 mL          LABS:                        13.4   12.60 )-----------( 238      ( 11 Apr 2021 06:20 )             39.8     04-11    137  |  101  |  27<H>  ----------------------------<  90  3.9   |  30  |  0.96    Ca    8.5      11 Apr 2021 06:20  Phos  3.2     04-11  Mg     2.2     04-11    Impression:  72 y/o M with PMH HTN, HLD, splenectomy s/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   +Flatus, -BM. Mild abdominal distention.     Plan  -Continue low residue diet.   -Local wound care, continue Prevena, local drain care, monitor output.   -Encouraged OOB to chair, IC use.  -DVT prophylaxis.   -Pain management as needed.   -D/c planning Monday.   -Discussed with Dr. Navarro.

## 2021-04-12 ENCOUNTER — TRANSCRIPTION ENCOUNTER (OUTPATIENT)
Age: 71
End: 2021-04-12

## 2021-04-12 VITALS — RESPIRATION RATE: 20 BRPM | OXYGEN SATURATION: 95 %

## 2021-04-12 LAB
ANION GAP SERPL CALC-SCNC: 5 MMOL/L — SIGNIFICANT CHANGE UP (ref 5–17)
BUN SERPL-MCNC: 20 MG/DL — SIGNIFICANT CHANGE UP (ref 7–23)
CALCIUM SERPL-MCNC: 8.9 MG/DL — SIGNIFICANT CHANGE UP (ref 8.5–10.1)
CHLORIDE SERPL-SCNC: 100 MMOL/L — SIGNIFICANT CHANGE UP (ref 96–108)
CO2 SERPL-SCNC: 31 MMOL/L — SIGNIFICANT CHANGE UP (ref 22–31)
CREAT SERPL-MCNC: 0.8 MG/DL — SIGNIFICANT CHANGE UP (ref 0.5–1.3)
GLUCOSE SERPL-MCNC: 77 MG/DL — SIGNIFICANT CHANGE UP (ref 70–99)
HCT VFR BLD CALC: 39.8 % — SIGNIFICANT CHANGE UP (ref 39–50)
HGB BLD-MCNC: 13 G/DL — SIGNIFICANT CHANGE UP (ref 13–17)
MAGNESIUM SERPL-MCNC: 2.1 MG/DL — SIGNIFICANT CHANGE UP (ref 1.6–2.6)
MCHC RBC-ENTMCNC: 29.3 PG — SIGNIFICANT CHANGE UP (ref 27–34)
MCHC RBC-ENTMCNC: 32.7 GM/DL — SIGNIFICANT CHANGE UP (ref 32–36)
MCV RBC AUTO: 89.8 FL — SIGNIFICANT CHANGE UP (ref 80–100)
NRBC # BLD: 0 /100 WBCS — SIGNIFICANT CHANGE UP (ref 0–0)
PHOSPHATE SERPL-MCNC: 3.5 MG/DL — SIGNIFICANT CHANGE UP (ref 2.5–4.5)
PLATELET # BLD AUTO: 267 K/UL — SIGNIFICANT CHANGE UP (ref 150–400)
POTASSIUM SERPL-MCNC: 4.4 MMOL/L — SIGNIFICANT CHANGE UP (ref 3.5–5.3)
POTASSIUM SERPL-SCNC: 4.4 MMOL/L — SIGNIFICANT CHANGE UP (ref 3.5–5.3)
RBC # BLD: 4.43 M/UL — SIGNIFICANT CHANGE UP (ref 4.2–5.8)
RBC # FLD: 14.2 % — SIGNIFICANT CHANGE UP (ref 10.3–14.5)
SODIUM SERPL-SCNC: 136 MMOL/L — SIGNIFICANT CHANGE UP (ref 135–145)
WBC # BLD: 9.69 K/UL — SIGNIFICANT CHANGE UP (ref 3.8–10.5)
WBC # FLD AUTO: 9.69 K/UL — SIGNIFICANT CHANGE UP (ref 3.8–10.5)

## 2021-04-12 RX ADMIN — CYCLOBENZAPRINE HYDROCHLORIDE 10 MILLIGRAM(S): 10 TABLET, FILM COATED ORAL at 05:39

## 2021-04-12 RX ADMIN — OXYCODONE HYDROCHLORIDE 5 MILLIGRAM(S): 5 TABLET ORAL at 05:39

## 2021-04-12 RX ADMIN — VALSARTAN 160 MILLIGRAM(S): 80 TABLET ORAL at 05:39

## 2021-04-12 RX ADMIN — MAGNESIUM HYDROXIDE 30 MILLILITER(S): 400 TABLET, CHEWABLE ORAL at 05:39

## 2021-04-12 NOTE — DISCHARGE NOTE NURSING/CASE MANAGEMENT/SOCIAL WORK - PATIENT PORTAL LINK FT
You can access the FollowMyHealth Patient Portal offered by [facility] by registering at the following website: [email or web address]. By joining [employer]’s FollowMyHealth portal, you will also be able to view your health information using other applications (apps) compatible with our system.

## 2021-04-12 NOTE — PROGRESS NOTE ADULT - SUBJECTIVE AND OBJECTIVE BOX
Patient seen and examined at bedside in no distress.  No complaints offered.  Tolerating regular diet. Denies abdominal pain, nausea, vomiting.  +BM.   Denies fever, chills, chest pain, sob.  Has been ambulating.    Vital Signs Last 24 Hrs  T(F): 97.7 (04-12-21 @ 05:15), Max: 98.1 (04-11-21 @ 23:25)  HR: 73 (04-12-21 @ 05:15)  BP: 136/72 (04-12-21 @ 05:15)  RR: 18 (04-12-21 @ 05:15)  SpO2: 95% (04-12-21 @ 05:15)    GENERAL: Alert, oriented, NAD  CHEST/LUNG: Clear to auscultation bilaterally, respirations nonlabored  HEART: S1S2, RRR  ABDOMEN: + Bowel sounds. Midline prevena intact, removed. Steris intact over midline suture line. No surrounding erythema/edema/rash. Soft, NTND. RLQ ELYSIA in place draining s/s output.   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   - regular diet as tolerated  - drain care, monitor output  - incentive spirometer, ambulate as tolerated, dvt ppx, pain management PRN  - discharge planning home today

## 2021-04-13 RX ORDER — CYCLOBENZAPRINE HYDROCHLORIDE 10 MG/1
1 TABLET, FILM COATED ORAL
Qty: 20 | Refills: 0
Start: 2021-04-13

## 2021-04-19 ENCOUNTER — APPOINTMENT (OUTPATIENT)
Dept: SURGERY | Facility: CLINIC | Age: 71
End: 2021-04-19
Payer: MEDICARE

## 2021-04-19 VITALS
HEART RATE: 81 BPM | OXYGEN SATURATION: 97 % | DIASTOLIC BLOOD PRESSURE: 73 MMHG | WEIGHT: 183 LBS | BODY MASS INDEX: 27.11 KG/M2 | HEIGHT: 69 IN | TEMPERATURE: 96.9 F | SYSTOLIC BLOOD PRESSURE: 120 MMHG

## 2021-04-19 PROCEDURE — 99024 POSTOP FOLLOW-UP VISIT: CPT

## 2021-04-19 NOTE — HISTORY OF PRESENT ILLNESS
[de-identified] : pt seen and examined. pt is s/p complex abdominal hernia repair using component separation. pt has been passing gas and having bowel movement. on exam, wound edges are  healing well. he is concerned about some swelling near his inferior aspect of his incision which i told is seroma cavity. i reassured to him that this will resolved over time. the ELYSIA is still draining over 50 cc per day. he will check his output and come back  either thursday or monday based on output.

## 2021-04-22 ENCOUNTER — APPOINTMENT (OUTPATIENT)
Dept: SURGERY | Facility: CLINIC | Age: 71
End: 2021-04-22
Payer: MEDICARE

## 2021-04-22 VITALS
OXYGEN SATURATION: 97 % | DIASTOLIC BLOOD PRESSURE: 73 MMHG | HEIGHT: 69 IN | WEIGHT: 183 LBS | BODY MASS INDEX: 27.11 KG/M2 | SYSTOLIC BLOOD PRESSURE: 133 MMHG | HEART RATE: 73 BPM | TEMPERATURE: 96.9 F

## 2021-04-22 PROCEDURE — 99024 POSTOP FOLLOW-UP VISIT: CPT

## 2021-04-26 ENCOUNTER — APPOINTMENT (OUTPATIENT)
Dept: SURGERY | Facility: CLINIC | Age: 71
End: 2021-04-26

## 2021-04-26 DIAGNOSIS — K43.2 INCISIONAL HERNIA WITHOUT OBSTRUCTION OR GANGRENE: ICD-10-CM

## 2021-04-26 DIAGNOSIS — I10 ESSENTIAL (PRIMARY) HYPERTENSION: ICD-10-CM

## 2021-04-26 DIAGNOSIS — E78.5 HYPERLIPIDEMIA, UNSPECIFIED: ICD-10-CM

## 2021-04-26 DIAGNOSIS — Z79.899 OTHER LONG TERM (CURRENT) DRUG THERAPY: ICD-10-CM

## 2021-05-24 ENCOUNTER — APPOINTMENT (OUTPATIENT)
Dept: SURGERY | Facility: CLINIC | Age: 71
End: 2021-05-24
Payer: MEDICARE

## 2021-05-24 VITALS
OXYGEN SATURATION: 97 % | HEART RATE: 68 BPM | SYSTOLIC BLOOD PRESSURE: 131 MMHG | DIASTOLIC BLOOD PRESSURE: 73 MMHG | TEMPERATURE: 97.9 F

## 2021-05-24 PROCEDURE — 99024 POSTOP FOLLOW-UP VISIT: CPT

## 2021-05-24 NOTE — HISTORY OF PRESENT ILLNESS
[de-identified] : Pt seen and examined. Pt is s/p ex lap, abdominal wall reconstruction, who came back for follow up for us to evaluate his excess skin. He has lost 15 lb since the operation. On exam, the repair is intact. He was reassured that its excess skin and adipose tissue. He is concerned about cosmetics of the repair.  He will be referred to plastic surgery for further management.

## 2021-06-17 ENCOUNTER — APPOINTMENT (OUTPATIENT)
Dept: PLASTIC SURGERY | Facility: CLINIC | Age: 71
End: 2021-06-17
Payer: MEDICARE

## 2021-06-17 VITALS — HEIGHT: 69 IN | WEIGHT: 185 LBS | BODY MASS INDEX: 27.4 KG/M2

## 2021-06-17 PROCEDURE — 99072 ADDL SUPL MATRL&STAF TM PHE: CPT

## 2021-06-17 PROCEDURE — 99203 OFFICE O/P NEW LOW 30 MIN: CPT

## 2021-07-19 ENCOUNTER — OUTPATIENT (OUTPATIENT)
Dept: OUTPATIENT SERVICES | Facility: HOSPITAL | Age: 71
LOS: 1 days | End: 2021-07-19
Payer: MEDICARE

## 2021-07-19 VITALS
OXYGEN SATURATION: 98 % | HEIGHT: 69 IN | RESPIRATION RATE: 18 BRPM | DIASTOLIC BLOOD PRESSURE: 74 MMHG | WEIGHT: 186.95 LBS | SYSTOLIC BLOOD PRESSURE: 126 MMHG | HEART RATE: 61 BPM | TEMPERATURE: 97 F

## 2021-07-19 DIAGNOSIS — L91.0 HYPERTROPHIC SCAR: ICD-10-CM

## 2021-07-19 DIAGNOSIS — Z90.81 ACQUIRED ABSENCE OF SPLEEN: Chronic | ICD-10-CM

## 2021-07-19 DIAGNOSIS — Z85.09 PERSONAL HISTORY OF MALIGNANT NEOPLASM OF OTHER DIGESTIVE ORGANS: ICD-10-CM

## 2021-07-19 DIAGNOSIS — Z98.890 OTHER SPECIFIED POSTPROCEDURAL STATES: Chronic | ICD-10-CM

## 2021-07-19 DIAGNOSIS — Z01.818 ENCOUNTER FOR OTHER PREPROCEDURAL EXAMINATION: ICD-10-CM

## 2021-07-19 PROCEDURE — G0463: CPT

## 2021-07-19 RX ORDER — CHLORHEXIDINE GLUCONATE 213 G/1000ML
1 SOLUTION TOPICAL ONCE
Refills: 0 | Status: DISCONTINUED | OUTPATIENT
Start: 2021-08-02 | End: 2021-08-16

## 2021-07-19 RX ORDER — IBUPROFEN 200 MG
1 TABLET ORAL
Qty: 0 | Refills: 0 | DISCHARGE

## 2021-07-19 RX ORDER — LIDOCAINE HCL 20 MG/ML
0.2 VIAL (ML) INJECTION ONCE
Refills: 0 | Status: DISCONTINUED | OUTPATIENT
Start: 2021-08-02 | End: 2021-08-16

## 2021-07-19 RX ORDER — ACETAMINOPHEN 500 MG
2 TABLET ORAL
Qty: 0 | Refills: 0 | DISCHARGE

## 2021-07-19 RX ORDER — SODIUM CHLORIDE 9 MG/ML
3 INJECTION INTRAMUSCULAR; INTRAVENOUS; SUBCUTANEOUS EVERY 8 HOURS
Refills: 0 | Status: DISCONTINUED | OUTPATIENT
Start: 2021-08-02 | End: 2021-08-16

## 2021-07-19 NOTE — H&P PST ADULT - MALLAMPATI CLASS
Offered and patient declined Class I (easy) - visualization of the soft palate, fauces, uvula, and both anterior and posterior pillars

## 2021-07-19 NOTE — H&P PST ADULT - NSICDXPROBLEM_GEN_ALL_CORE_FT
PROBLEM DIAGNOSES  Problem: Hypertrophic scar  Assessment and Plan: coming in for revision of abdominal scar  possible liposuction

## 2021-07-19 NOTE — CONSULT LETTER
[Dear  ___] : Dear  [unfilled], [Consult Letter:] : I had the pleasure of evaluating your patient, [unfilled]. [Please see my note below.] : Please see my note below. [Consult Closing:] : Thank you very much for allowing me to participate in the care of this patient.  If you have any questions, please do not hesitate to contact me. [Sincerely,] : Sincerely, [FreeTextEntry3] : Adebayo Wallace MD, FACS

## 2021-07-19 NOTE — H&P PST ADULT - NSICDXPASTMEDICALHX_GEN_ALL_CORE_FT
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

## 2021-07-19 NOTE — H&P PST ADULT - HISTORY OF PRESENT ILLNESS
71yr old male who had a gastrointestinal stromal tumor removed 1/2021.Old Mesh from previous  ventral surgery in 1999 had to be removed and new reconstructed mesh put in on  4/2021. After few months pt  developed complication, possible fluid around abdomen from mesh. Now coming in for  revision of abdominal scar possible liposuction.    Note; [employer] x2 1/31/21

## 2021-07-19 NOTE — H&P PST ADULT - NSICDXPASTSURGICALHX_GEN_ALL_CORE_FT
PAST SURGICAL HISTORY:  H/O splenectomy 1999 s/p injury    H/O ventral hernia repair 1999 with mesh    History of abdominal surgery reconstruction of abdominal mesh 4/2021    S/P endoscopy 2/19

## 2021-07-20 RX ORDER — CEFAZOLIN SODIUM 1 G
2000 VIAL (EA) INJECTION ONCE
Refills: 0 | Status: DISCONTINUED | OUTPATIENT
Start: 2021-08-02 | End: 2021-08-16

## 2021-07-21 PROBLEM — D13.1 BENIGN NEOPLASM OF STOMACH: Chronic | Status: ACTIVE | Noted: 2020-02-05

## 2021-07-21 PROBLEM — Z86.19 PERSONAL HISTORY OF OTHER INFECTIOUS AND PARASITIC DISEASES: Chronic | Status: ACTIVE | Noted: 2021-07-19
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
## 2021-07-22 ENCOUNTER — APPOINTMENT (OUTPATIENT)
Dept: PLASTIC SURGERY | Facility: CLINIC | Age: 71
End: 2021-07-22
Payer: SELF-PAY

## 2021-07-22 VITALS
DIASTOLIC BLOOD PRESSURE: 78 MMHG | TEMPERATURE: 98 F | BODY MASS INDEX: 27.7 KG/M2 | SYSTOLIC BLOOD PRESSURE: 143 MMHG | HEART RATE: 67 BPM | WEIGHT: 187 LBS | HEIGHT: 69 IN | OXYGEN SATURATION: 95 %

## 2021-07-22 PROCEDURE — 99212 OFFICE O/P EST SF 10 MIN: CPT

## 2021-07-23 RX ORDER — ROSUVASTATIN CALCIUM 10 MG/1
10 TABLET, FILM COATED ORAL
Qty: 90 | Refills: 0 | Status: ACTIVE | COMMUNITY
Start: 2021-04-19

## 2021-08-01 ENCOUNTER — TRANSCRIPTION ENCOUNTER (OUTPATIENT)
Age: 71
End: 2021-08-01

## 2021-08-01 RX ORDER — SODIUM CHLORIDE 9 MG/ML
1000 INJECTION, SOLUTION INTRAVENOUS
Refills: 0 | Status: DISCONTINUED | OUTPATIENT
Start: 2021-08-02 | End: 2021-08-16

## 2021-08-02 ENCOUNTER — RESULT REVIEW (OUTPATIENT)
Age: 71
End: 2021-08-02

## 2021-08-02 ENCOUNTER — OUTPATIENT (OUTPATIENT)
Dept: OUTPATIENT SERVICES | Facility: HOSPITAL | Age: 71
LOS: 1 days | End: 2021-08-02
Payer: MEDICARE

## 2021-08-02 ENCOUNTER — APPOINTMENT (OUTPATIENT)
Dept: PLASTIC SURGERY | Facility: HOSPITAL | Age: 71
End: 2021-08-02
Payer: MEDICARE

## 2021-08-02 VITALS
DIASTOLIC BLOOD PRESSURE: 60 MMHG | RESPIRATION RATE: 16 BRPM | SYSTOLIC BLOOD PRESSURE: 130 MMHG | HEART RATE: 66 BPM | OXYGEN SATURATION: 97 %

## 2021-08-02 VITALS
SYSTOLIC BLOOD PRESSURE: 152 MMHG | TEMPERATURE: 98 F | WEIGHT: 186.95 LBS | HEIGHT: 69 IN | OXYGEN SATURATION: 98 % | HEART RATE: 64 BPM | DIASTOLIC BLOOD PRESSURE: 74 MMHG | RESPIRATION RATE: 16 BRPM

## 2021-08-02 DIAGNOSIS — Z90.81 ACQUIRED ABSENCE OF SPLEEN: Chronic | ICD-10-CM

## 2021-08-02 DIAGNOSIS — Z98.890 OTHER SPECIFIED POSTPROCEDURAL STATES: Chronic | ICD-10-CM

## 2021-08-02 DIAGNOSIS — L91.0 HYPERTROPHIC SCAR: ICD-10-CM

## 2021-08-02 DIAGNOSIS — Z85.09 PERSONAL HISTORY OF MALIGNANT NEOPLASM OF OTHER DIGESTIVE ORGANS: ICD-10-CM

## 2021-08-02 PROCEDURE — 14302 TIS TRNFR ADDL 30 SQ CM: CPT

## 2021-08-02 PROCEDURE — 15002 WOUND PREP TRK/ARM/LEG: CPT

## 2021-08-02 PROCEDURE — 14301 TIS TRNFR ANY 30.1-60 SQ CM: CPT

## 2021-08-02 PROCEDURE — 88304 TISSUE EXAM BY PATHOLOGIST: CPT | Mod: 26

## 2021-08-02 PROCEDURE — 88304 TISSUE EXAM BY PATHOLOGIST: CPT

## 2021-08-02 PROCEDURE — 15877 SUCTION LIPECTOMY TRUNK: CPT

## 2021-08-02 PROCEDURE — C1889: CPT

## 2021-08-02 PROCEDURE — 17999 UNLISTD PX SKN MUC MEMB SUBQ: CPT

## 2021-08-02 RX ORDER — CEPHALEXIN 500 MG
1 CAPSULE ORAL
Qty: 21 | Refills: 0
Start: 2021-08-02 | End: 2021-08-08

## 2021-08-02 NOTE — ASU PATIENT PROFILE, ADULT - PMH
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

## 2021-08-02 NOTE — ASU PATIENT PROFILE, ADULT - PSH
H/O splenectomy  1999 s/p injury  H/O ventral hernia repair  1999 with mesh  History of abdominal surgery  reconstruction of abdominal mesh 4/2021  S/P endoscopy  2/19

## 2021-08-02 NOTE — PRE-ANESTHESIA EVALUATION ADULT - NSANTHSNORERD_ENT_A_CORE
Subjective:      Patient ID: Jessica Gillespie is a 60 y o  female  Chief Complaint   Patient presents with    Follow-up    Hyperlipidemia    Cough    itchy ears    Hernia     keeps popping out        Pt presents for HLD and GERD f/u  States that she is doing well  HLD appears to be well-controlled -- following low fat diet, good compliance/tolerance to statin  Due for Lipid panel (orders previously entered)  Adds that GERD has flared in the last few days since URI/cough sx  Takes PPI daily -- usually with good sx control     Also c/o hernia (RLQ)  This has been more noticeable over the last 2-3 months  Often pops out when she rotates torso, coughs/sneezes, laughs  Hernia "bulge" is not painful, IS reducible  Denies any recent/concurrent change in bowel habits      Hyperlipidemia   This is a chronic problem  The current episode started more than 1 year ago  The problem is controlled  Exacerbating diseases include obesity  She has no history of chronic renal disease, diabetes, hypothyroidism, liver disease or nephrotic syndrome  There are no known factors aggravating her hyperlipidemia  Pertinent negatives include no chest pain, focal sensory loss, focal weakness, leg pain, myalgias or shortness of breath  Current antihyperlipidemic treatment includes statins  The current treatment provides moderate improvement of lipids  Compliance problems include adherence to diet, adherence to exercise and psychosocial issues  Risk factors for coronary artery disease include post-menopausal, obesity, family history and dyslipidemia  Cough   This is a new problem  The current episode started in the past 7 days  The problem has been waxing and waning  The problem occurs constantly  The cough is non-productive  Associated symptoms include ear congestion (itchy ears), nasal congestion (mild) and postnasal drip   Pertinent negatives include no chest pain, chills, ear pain, fever, headaches, heartburn, hemoptysis, myalgias, rash, rhinorrhea, sore throat, shortness of breath, sweats, weight loss or wheezing  Nothing aggravates the symptoms  She has tried nothing for the symptoms  There is no history of asthma, COPD or environmental allergies  The following portions of the patient's history were reviewed and updated as appropriate: allergies, current medications, past family history, past medical history, past social history, past surgical history and problem list       Current Outpatient Medications:     ACZONE 7 5 % GEL, APPLY ONCE DAILY, Disp: , Rfl: 3    aspirin 81 mg chewable tablet, Chew 81 mg daily, Disp: , Rfl:     atorvastatin (LIPITOR) 20 mg tablet, TAKE 1 TABLET BY MOUTH EVERY DAY, Disp: 90 tablet, Rfl: 3    Cholecalciferol (VITAMIN D3) 2000 units TABS, Take 2,000 Units by mouth daily, Disp: , Rfl:     Cyanocobalamin (VITAMIN B 12 PO), Take 5,000 mcg by mouth daily, Disp: , Rfl:     doxycycline monohydrate (MONODOX) 100 mg capsule, Take 100 mg by mouth as needed , Disp: , Rfl: 3    Multiple Vitamins-Calcium (ONE-A-DAY WOMENS FORMULA PO), Take 1 tablet by mouth daily, Disp: , Rfl:     Omega-3 Fatty Acids (FISH OIL) 1,000 mg, Take 4,000 mg by mouth daily, Disp: , Rfl:     omeprazole (PriLOSEC) 10 mg delayed release capsule, Take 10 mg by mouth daily, Disp: , Rfl:     SOOLANTRA 1 % CREA, , Disp: , Rfl:       Review of Systems   Constitutional: Negative for activity change, chills, fatigue, fever and weight loss  HENT: Positive for postnasal drip  Negative for congestion, ear pain, rhinorrhea, sinus pain and sore throat  Eyes: Negative for pain and itching  Respiratory: Positive for cough  Negative for hemoptysis, chest tightness, shortness of breath and wheezing  Cardiovascular: Negative for chest pain and palpitations  Gastrointestinal: Negative for abdominal pain, constipation, diarrhea, heartburn, nausea and vomiting          Hernia per HPI   Endocrine: Negative for cold intolerance and heat intolerance  Genitourinary: Negative for dysuria  Musculoskeletal: Negative for myalgias  Skin: Negative for color change and rash  Allergic/Immunologic: Negative for environmental allergies  Neurological: Negative for dizziness, focal weakness, syncope and headaches  Hematological: Negative for adenopathy  Psychiatric/Behavioral: Negative for behavioral problems, dysphoric mood and sleep disturbance  The patient is not nervous/anxious  Objective:      /70 (BP Location: Left arm, Patient Position: Sitting, Cuff Size: Adult)   Pulse 64   Temp 98 7 °F (37 1 °C) (Tympanic)   Ht 5' 3" (1 6 m)   Wt 71 8 kg (158 lb 6 4 oz)   SpO2 97%   BMI 28 06 kg/m²          Physical Exam   Constitutional: She is oriented to person, place, and time  She appears well-developed and well-nourished  No distress  Body mass index is 28 06 kg/m²  HENT:   Head: Normocephalic and atraumatic  Right Ear: External ear and ear canal normal  Tympanic membrane is retracted  Left Ear: External ear and ear canal normal  Tympanic membrane is retracted  Nose: Mucosal edema present  Right sinus exhibits no maxillary sinus tenderness and no frontal sinus tenderness  Left sinus exhibits no maxillary sinus tenderness and no frontal sinus tenderness  Mouth/Throat: Uvula is midline and mucous membranes are normal  Oropharyngeal exudate (PND/cobblestoning) present  Eyes: Pupils are equal, round, and reactive to light  Conjunctivae and EOM are normal  No scleral icterus  Neck: Normal range of motion  Neck supple  No thyromegaly present  Cardiovascular: Normal rate, regular rhythm and normal heart sounds  No murmur heard  Pulmonary/Chest: Effort normal and breath sounds normal  No respiratory distress  She has no wheezes  Abdominal: Soft  Bowel sounds are normal  She exhibits no distension  There is no hepatosplenomegaly  There is no tenderness  There is no CVA tenderness  A hernia is present   Hernia confirmed positive in the ventral area  Musculoskeletal: Normal range of motion  She exhibits no edema, tenderness or deformity  Lymphadenopathy:     She has no cervical adenopathy  Neurological: She is alert and oriented to person, place, and time  No cranial nerve deficit  Coordination normal    Skin: Skin is warm and dry  No rash noted  No erythema  No pallor  Psychiatric: She has a normal mood and affect  Her behavior is normal    Nursing note and vitals reviewed  Assessment/Plan:  Diagnoses and all orders for this visit:    GERD without esophagitis        -     Stable on PPI -- recent exacerbation secondary to postnasal drip    Mixed hyperlipidemia        -     Stable on atorvastatin -- check lipid panel to ensure goals met    Allergic rhinitis, unspecified seasonality, unspecified trigger        -     Reassurance given -- empirically tx with OTC oral antihistamines, nasal steroid sprays, salt water gargles, and saline nasal spray;  Call if sx not improving in 1-2 wks    Ventral hernia without obstruction or gangrene  -     Ambulatory referral to General Surgery; Future  -     Recommend surgical consult to discuss repair options;  ER precautions reviewed        BMI Counseling: Body mass index is 28 06 kg/m²  The BMI is above normal  Nutrition recommendations include reducing portion sizes, decreasing overall calorie intake, 3-5 servings of fruits/vegetables daily, reducing fast food intake, consuming healthier snacks, decreasing soda and/or juice intake, moderation in carbohydrate intake, increasing intake of lean protein, reducing intake of saturated fat and trans fat and reducing intake of cholesterol  Exercise recommendations include moderate aerobic physical activity for 150 minutes/week  Return in about 6 months (around 3/16/2020) for Annual physical     The patient indicates understanding of these issues and agrees with the plan          Andra Allen DO No Yes

## 2021-08-02 NOTE — BRIEF OPERATIVE NOTE - OPERATION/FINDINGS
midline abdominal scar s/p open resection of gist w/ mesh reconstruction of abdominal wall  underwent drainage of seroma, liposuction, and scar revision

## 2021-08-02 NOTE — ASU DISCHARGE PLAN (ADULT/PEDIATRIC) - ASU DC SPECIAL INSTRUCTIONSFT
1. Please follow up with Dr. Wallace's PA, Parvin, next week TUESDAY for your first post operative visit.   Please call [phone] for an appointment time.    2. Please avoid any strenuous activities, AVOID bending, stretching, reaching overhead, or any heavy lifting.    3. Some OOZING and blood on the dressing is normal and to be expected. If it becomes saturated w/ BRIGHT red blood that does not stop, please call [phone] for email PARVIN: [email or web address]    4. Your medications were sent to your pharmacy:  Keflex 500 mg - Antibiotics  Percocet 5/325 mg - Pain medications.  Please take the prescribed medications as directed.   For MILD pain please take regular over the counter pain medications such as: Advil, Tylenol, Motrin.   Only take the narcotic prescribed pain medication for SEVERE PAIN.   If constipation occurs after taking narcotic pain medications, please take an over the counter stool softener.

## 2021-08-06 LAB — SURGICAL PATHOLOGY STUDY: SIGNIFICANT CHANGE UP

## 2021-08-10 ENCOUNTER — APPOINTMENT (OUTPATIENT)
Dept: PLASTIC SURGERY | Facility: CLINIC | Age: 71
End: 2021-08-10
Payer: MEDICARE

## 2021-08-10 VITALS
HEIGHT: 69 IN | BODY MASS INDEX: 27.4 KG/M2 | SYSTOLIC BLOOD PRESSURE: 114 MMHG | TEMPERATURE: 97.6 F | WEIGHT: 185 LBS | HEART RATE: 63 BPM | OXYGEN SATURATION: 97 % | DIASTOLIC BLOOD PRESSURE: 70 MMHG

## 2021-08-10 PROCEDURE — 99212 OFFICE O/P EST SF 10 MIN: CPT

## 2021-08-17 ENCOUNTER — APPOINTMENT (OUTPATIENT)
Dept: PLASTIC SURGERY | Facility: CLINIC | Age: 71
End: 2021-08-17
Payer: MEDICARE

## 2021-08-17 VITALS
HEART RATE: 61 BPM | TEMPERATURE: 98 F | SYSTOLIC BLOOD PRESSURE: 134 MMHG | DIASTOLIC BLOOD PRESSURE: 67 MMHG | OXYGEN SATURATION: 97 % | WEIGHT: 186 LBS | BODY MASS INDEX: 27.55 KG/M2 | HEIGHT: 69 IN

## 2021-08-17 PROCEDURE — 99024 POSTOP FOLLOW-UP VISIT: CPT

## 2021-08-19 NOTE — H&P PST ADULT - SKIN/BREAST
Is This A New Presentation, Or A Follow-Up?: Skin Lesions How Severe Is Your Skin Lesion?: mild Have Your Skin Lesions Been Treated?: not been treated negative

## 2021-08-24 ENCOUNTER — APPOINTMENT (OUTPATIENT)
Dept: PLASTIC SURGERY | Facility: CLINIC | Age: 71
End: 2021-08-24
Payer: MEDICARE

## 2021-08-24 VITALS
SYSTOLIC BLOOD PRESSURE: 125 MMHG | WEIGHT: 185 LBS | HEART RATE: 55 BPM | DIASTOLIC BLOOD PRESSURE: 70 MMHG | TEMPERATURE: 97.9 F | BODY MASS INDEX: 27.4 KG/M2 | HEIGHT: 69 IN | OXYGEN SATURATION: 96 %

## 2021-08-24 DIAGNOSIS — K43.9 VENTRAL HERNIA W/OUT OBSTRUCTION OR GANGRENE: ICD-10-CM

## 2021-08-24 PROCEDURE — 99024 POSTOP FOLLOW-UP VISIT: CPT

## 2021-09-07 ENCOUNTER — APPOINTMENT (OUTPATIENT)
Dept: PLASTIC SURGERY | Facility: CLINIC | Age: 71
End: 2021-09-07
Payer: MEDICARE

## 2021-09-07 VITALS — BODY MASS INDEX: 27.4 KG/M2 | WEIGHT: 185 LBS | TEMPERATURE: 97.2 F | HEIGHT: 69 IN

## 2021-09-07 PROCEDURE — 99024 POSTOP FOLLOW-UP VISIT: CPT

## 2021-09-14 ENCOUNTER — APPOINTMENT (OUTPATIENT)
Dept: PLASTIC SURGERY | Facility: CLINIC | Age: 71
End: 2021-09-14

## 2021-09-14 NOTE — REASON FOR VISIT
[Post Op: _________] : a [unfilled] post op visit [FreeTextEntry1] : s/p revision of abdominal scar, liposuction DOS: 08/02/21.

## 2021-10-19 ENCOUNTER — APPOINTMENT (OUTPATIENT)
Dept: PLASTIC SURGERY | Facility: CLINIC | Age: 71
End: 2021-10-19

## 2021-10-19 VITALS
HEART RATE: 72 BPM | DIASTOLIC BLOOD PRESSURE: 71 MMHG | HEIGHT: 69 IN | OXYGEN SATURATION: 96 % | TEMPERATURE: 97.3 F | WEIGHT: 185 LBS | BODY MASS INDEX: 27.4 KG/M2 | SYSTOLIC BLOOD PRESSURE: 139 MMHG

## 2021-10-28 ENCOUNTER — APPOINTMENT (OUTPATIENT)
Dept: PLASTIC SURGERY | Facility: CLINIC | Age: 71
End: 2021-10-28
Payer: SELF-PAY

## 2021-10-28 ENCOUNTER — RESULT REVIEW (OUTPATIENT)
Age: 71
End: 2021-10-28

## 2021-10-28 VITALS
TEMPERATURE: 97.4 F | HEIGHT: 69 IN | BODY MASS INDEX: 26.96 KG/M2 | SYSTOLIC BLOOD PRESSURE: 130 MMHG | DIASTOLIC BLOOD PRESSURE: 68 MMHG | OXYGEN SATURATION: 96 % | WEIGHT: 182 LBS | HEART RATE: 64 BPM

## 2021-10-28 PROCEDURE — 99024 POSTOP FOLLOW-UP VISIT: CPT

## 2021-11-09 ENCOUNTER — APPOINTMENT (OUTPATIENT)
Dept: CT IMAGING | Facility: IMAGING CENTER | Age: 71
End: 2021-11-09
Payer: MEDICARE

## 2021-11-09 ENCOUNTER — OUTPATIENT (OUTPATIENT)
Dept: OUTPATIENT SERVICES | Facility: HOSPITAL | Age: 71
LOS: 1 days | End: 2021-11-09
Payer: MEDICARE

## 2021-11-09 DIAGNOSIS — Z00.8 ENCOUNTER FOR OTHER GENERAL EXAMINATION: ICD-10-CM

## 2021-11-09 DIAGNOSIS — Z98.890 OTHER SPECIFIED POSTPROCEDURAL STATES: Chronic | ICD-10-CM

## 2021-11-09 DIAGNOSIS — Z90.81 ACQUIRED ABSENCE OF SPLEEN: Chronic | ICD-10-CM

## 2021-11-09 DIAGNOSIS — K43.9 VENTRAL HERNIA WITHOUT OBSTRUCTION OR GANGRENE: ICD-10-CM

## 2021-11-09 DIAGNOSIS — Z85.09 PERSONAL HISTORY OF MALIGNANT NEOPLASM OF OTHER DIGESTIVE ORGANS: ICD-10-CM

## 2021-11-09 PROCEDURE — 74177 CT ABD & PELVIS W/CONTRAST: CPT

## 2021-11-09 PROCEDURE — 74177 CT ABD & PELVIS W/CONTRAST: CPT | Mod: 26

## 2021-11-09 PROCEDURE — 82565 ASSAY OF CREATININE: CPT

## 2021-11-12 PROBLEM — K43.9 HERNIA, VENTRAL: Status: ACTIVE | Noted: 2021-03-22

## 2021-11-17 ENCOUNTER — APPOINTMENT (OUTPATIENT)
Dept: PLASTIC SURGERY | Facility: CLINIC | Age: 71
End: 2021-11-17
Payer: MEDICARE

## 2021-11-17 VITALS
HEIGHT: 69 IN | HEART RATE: 70 BPM | BODY MASS INDEX: 27.55 KG/M2 | TEMPERATURE: 97.8 F | OXYGEN SATURATION: 96 % | DIASTOLIC BLOOD PRESSURE: 69 MMHG | WEIGHT: 186 LBS | SYSTOLIC BLOOD PRESSURE: 147 MMHG

## 2021-11-17 DIAGNOSIS — L91.0 HYPERTROPHIC SCAR: ICD-10-CM

## 2021-11-17 PROCEDURE — 99212 OFFICE O/P EST SF 10 MIN: CPT

## 2021-11-22 ENCOUNTER — OUTPATIENT (OUTPATIENT)
Dept: OUTPATIENT SERVICES | Facility: HOSPITAL | Age: 71
LOS: 1 days | End: 2021-11-22
Payer: MEDICARE

## 2021-11-22 VITALS
SYSTOLIC BLOOD PRESSURE: 134 MMHG | TEMPERATURE: 97 F | HEART RATE: 58 BPM | WEIGHT: 184.09 LBS | OXYGEN SATURATION: 98 % | DIASTOLIC BLOOD PRESSURE: 73 MMHG | HEIGHT: 68 IN | RESPIRATION RATE: 16 BRPM

## 2021-11-22 DIAGNOSIS — M95.8 OTHER SPECIFIED ACQUIRED DEFORMITIES OF MUSCULOSKELETAL SYSTEM: ICD-10-CM

## 2021-11-22 DIAGNOSIS — Z98.890 OTHER SPECIFIED POSTPROCEDURAL STATES: Chronic | ICD-10-CM

## 2021-11-22 DIAGNOSIS — L91.0 HYPERTROPHIC SCAR: ICD-10-CM

## 2021-11-22 DIAGNOSIS — Z90.81 ACQUIRED ABSENCE OF SPLEEN: Chronic | ICD-10-CM

## 2021-11-22 DIAGNOSIS — I10 ESSENTIAL (PRIMARY) HYPERTENSION: ICD-10-CM

## 2021-11-22 LAB
ANION GAP SERPL CALC-SCNC: 11 MMOL/L — SIGNIFICANT CHANGE UP (ref 7–14)
BUN SERPL-MCNC: 23 MG/DL — SIGNIFICANT CHANGE UP (ref 7–23)
CALCIUM SERPL-MCNC: 9.4 MG/DL — SIGNIFICANT CHANGE UP (ref 8.4–10.5)
CHLORIDE SERPL-SCNC: 102 MMOL/L — SIGNIFICANT CHANGE UP (ref 98–107)
CO2 SERPL-SCNC: 27 MMOL/L — SIGNIFICANT CHANGE UP (ref 22–31)
CREAT SERPL-MCNC: 0.91 MG/DL — SIGNIFICANT CHANGE UP (ref 0.5–1.3)
GLUCOSE SERPL-MCNC: 75 MG/DL — SIGNIFICANT CHANGE UP (ref 70–99)
HCT VFR BLD CALC: 43.4 % — SIGNIFICANT CHANGE UP (ref 39–50)
HGB BLD-MCNC: 14.7 G/DL — SIGNIFICANT CHANGE UP (ref 13–17)
MCHC RBC-ENTMCNC: 30.6 PG — SIGNIFICANT CHANGE UP (ref 27–34)
MCHC RBC-ENTMCNC: 33.9 GM/DL — SIGNIFICANT CHANGE UP (ref 32–36)
MCV RBC AUTO: 90.4 FL — SIGNIFICANT CHANGE UP (ref 80–100)
NRBC # BLD: 0 /100 WBCS — SIGNIFICANT CHANGE UP
NRBC # FLD: 0 K/UL — SIGNIFICANT CHANGE UP
PLATELET # BLD AUTO: 247 K/UL — SIGNIFICANT CHANGE UP (ref 150–400)
POTASSIUM SERPL-MCNC: 4.6 MMOL/L — SIGNIFICANT CHANGE UP (ref 3.5–5.3)
POTASSIUM SERPL-SCNC: 4.6 MMOL/L — SIGNIFICANT CHANGE UP (ref 3.5–5.3)
RBC # BLD: 4.8 M/UL — SIGNIFICANT CHANGE UP (ref 4.2–5.8)
RBC # FLD: 14.8 % — HIGH (ref 10.3–14.5)
SODIUM SERPL-SCNC: 140 MMOL/L — SIGNIFICANT CHANGE UP (ref 135–145)
WBC # BLD: 10.36 K/UL — SIGNIFICANT CHANGE UP (ref 3.8–10.5)
WBC # FLD AUTO: 10.36 K/UL — SIGNIFICANT CHANGE UP (ref 3.8–10.5)

## 2021-11-22 PROCEDURE — 93010 ELECTROCARDIOGRAM REPORT: CPT

## 2021-11-22 NOTE — H&P PST ADULT - PROBLEM SELECTOR PLAN 1
Revision of abdominal scar, possible liposuction.    CBC BMP EKG    Preop instructions and antibacterial soap given and explained (verbal and written), with teach back.    KATHRYN precautions per stop bang questionnaire criteria Revision of abdominal scar, possible liposuction.    CBC CMP EKG    Preop instructions and antibacterial soap given and explained (verbal and written), with teach back.    KATHRYN precautions per stop bang questionnaire criteria

## 2021-11-22 NOTE — H&P PST ADULT - NSICDXPASTSURGICALHX_GEN_ALL_CORE_FT
PAST SURGICAL HISTORY:  H/O abdominal surgery revision of abdoinal scar 8/2021    H/O splenectomy 1999 s/p injury    H/O ventral hernia repair 1999 with mesh    History of abdominal surgery reconstruction of abdominal mesh 4/2021    S/P endoscopy 2/19

## 2021-11-22 NOTE — H&P PST ADULT - GASTROINTESTINAL COMMENTS
hx of multiple surgeries interventions in hips abdomen with mesh placement  pt with hx of deformity of abdominal skin with discomfort  as result of multiple surgeries and mesh.  S/P revision of abdominal scar, liposuction 8/2021.  Now with deformity and fluid at surgeries site.  Scheduled for Revision of abdominal scar, possible liposuction. abdominal deformity per dx with fullness to postaortic area and suprapubic area hx of multiple surgical interventions in abdomen with mesh placement.   Hx of exploratory lap following trauma from fall 1999. Pt also had abdominal surgery 2020 for GIST.  S/P revision of abdominal scar, liposuction 8/2021.  Now with deformity and fluid at surgeries site.  Scheduled for Revision of abdominal scar, possible liposuction.

## 2021-11-22 NOTE — H&P PST ADULT - ASSESSMENT
72 y/o male with hx of multiple surgical  interventions in abdomen with mesh placement.   Hx of exploratory lap following trauma from fall 1999. Pt also had abdominal surgery 2020 for GIST.  S/P revision of abdominal scar, liposuction 8/2021.  Now with deformity and fluid at surgeries site.  Scheduled for Revision of abdominal scar, possible liposuction.  70 y/o male with hx of elevated PSA, 6.0, MRI was done and showed a suspicious lesion; biopsy showed adenocarcinoma.  Scheduled for single Port Robotic Prostatectomy.

## 2021-11-22 NOTE — H&P PST ADULT - HISTORY OF PRESENT ILLNESS
70 y/o male with hx of multiple surgeries interventions in hips abdomen with mesh placement  pt with hx of deformity of abdominal skin with discomfort  as result of multiple surgeries and mesh.  S/P revision of abdominal scar, liposuction 8/2021.  Now with deformity and fluid at surgeries site.  Scheduled for Revision of abdominal scar, possible liposuction.  72 y/o male with hx of multiple surgerical interventions in abdomen with mesh placement.   Hx of exploratory lap following trauma from fall 1999. Pt also had abdominal surgery 2020 for GIST.  S/P revision of abdominal scar, liposuction 8/2021.  Now with deformity and fluid at surgeries site.  Scheduled for Revision of abdominal scar, possible liposuction.  70 y/o male with hx of multiple surgerical interventions in abdomen with mesh placement.   Hx of exploratory lap following trauma from fall 1999. Pt also had abdominal surgery 2020 for GIST.  S/P revision of abdominal scar, liposuction 8/2021.  Now with abdominal deformity and fluid at surgery site.  Scheduled for Revision of abdominal scar, possible liposuction.

## 2021-11-22 NOTE — H&P PST ADULT - PROBLEM SELECTOR PLAN 2
Pt advised to take his Valsartan with sip of water morning of surgery    Pt reports he has appt with PMD 11/23/21  for pre-op eval, requested on chart. Pt reports he had stress and echo done by PMD (Cardiologist) 2020, requested on chart

## 2021-11-23 DIAGNOSIS — Z01.818 ENCOUNTER FOR OTHER PREPROCEDURAL EXAMINATION: ICD-10-CM

## 2021-11-26 ENCOUNTER — APPOINTMENT (OUTPATIENT)
Dept: DISASTER EMERGENCY | Facility: CLINIC | Age: 71
End: 2021-11-26

## 2021-11-26 LAB — SARS-COV-2 N GENE NPH QL NAA+PROBE: NOT DETECTED

## 2021-11-28 ENCOUNTER — TRANSCRIPTION ENCOUNTER (OUTPATIENT)
Age: 71
End: 2021-11-28

## 2021-11-29 ENCOUNTER — OUTPATIENT (OUTPATIENT)
Dept: OUTPATIENT SERVICES | Facility: HOSPITAL | Age: 71
LOS: 1 days | Discharge: HOME | End: 2021-11-29

## 2021-11-29 ENCOUNTER — APPOINTMENT (OUTPATIENT)
Dept: PLASTIC SURGERY | Facility: HOSPITAL | Age: 71
End: 2021-11-29
Payer: MEDICARE

## 2021-11-29 VITALS
WEIGHT: 184.09 LBS | SYSTOLIC BLOOD PRESSURE: 150 MMHG | RESPIRATION RATE: 16 BRPM | DIASTOLIC BLOOD PRESSURE: 70 MMHG | TEMPERATURE: 98 F | OXYGEN SATURATION: 97 % | HEIGHT: 68 IN | HEART RATE: 68 BPM

## 2021-11-29 VITALS — TEMPERATURE: 98 F

## 2021-11-29 DIAGNOSIS — Z98.890 OTHER SPECIFIED POSTPROCEDURAL STATES: Chronic | ICD-10-CM

## 2021-11-29 DIAGNOSIS — Z90.81 ACQUIRED ABSENCE OF SPLEEN: Chronic | ICD-10-CM

## 2021-11-29 DIAGNOSIS — L91.0 HYPERTROPHIC SCAR: ICD-10-CM

## 2021-11-29 PROCEDURE — 15002 WOUND PREP TRK/ARM/LEG: CPT

## 2021-11-29 PROCEDURE — 14302 TIS TRNFR ADDL 30 SQ CM: CPT

## 2021-11-29 PROCEDURE — 14301 TIS TRNFR ANY 30.1-60 SQ CM: CPT

## 2021-11-29 PROCEDURE — 10140 I&D HMTMA SEROMA/FLUID COLLJ: CPT

## 2021-11-29 RX ORDER — VALSARTAN 80 MG/1
1 TABLET ORAL
Qty: 0 | Refills: 0 | DISCHARGE

## 2021-11-29 RX ORDER — ROSUVASTATIN CALCIUM 5 MG/1
1 TABLET ORAL
Qty: 0 | Refills: 0 | DISCHARGE

## 2021-11-29 NOTE — BRIEF OPERATIVE NOTE - OPERATION/FINDINGS
Vertical scar over sternum and another vertical scar beneath the umbilicus midline. Scars were excised and skin flaps elevated and reapproximated using monocryl and vicryl sutures. Incisions closed without tension and covered with dermabond prineo. EBL 5

## 2021-11-29 NOTE — ASU DISCHARGE PLAN (ADULT/PEDIATRIC) - CARE PROVIDER_API CALL
Adebayo Wallace)  ColonRectal Surgery; Plastic Surgery; Surgery; Surgery of the Hand  [address], [address]  [place] [address]  Phone: [phone]  Fax: [phone]  Established Patient  Follow Up Time: 1 week

## 2021-11-29 NOTE — ASU DISCHARGE PLAN (ADULT/PEDIATRIC) - ASU DC SPECIAL INSTRUCTIONSFT
Please continue to wear your abdominal binder and padding until you see Dr. Wallace next week. It is okay to remove the binder and padding to shower. While showering please do not scrub the incision sites and please do not remove the tape over your incisions. This will fall off on its own.     You have percocets sent to you pharmacy. These are strong pain medications that should only be used when your pain is not well controlled with over the counter pain medication.

## 2021-12-07 ENCOUNTER — APPOINTMENT (OUTPATIENT)
Dept: PLASTIC SURGERY | Facility: CLINIC | Age: 71
End: 2021-12-07
Payer: MEDICARE

## 2021-12-07 VITALS
HEIGHT: 69 IN | WEIGHT: 186 LBS | OXYGEN SATURATION: 97 % | SYSTOLIC BLOOD PRESSURE: 164 MMHG | BODY MASS INDEX: 27.55 KG/M2 | HEART RATE: 71 BPM | DIASTOLIC BLOOD PRESSURE: 75 MMHG | TEMPERATURE: 97.8 F

## 2021-12-07 DIAGNOSIS — Z85.09 PERSONAL HISTORY OF MALIGNANT NEOPLASM OF OTHER DIGESTIVE ORGANS: ICD-10-CM

## 2021-12-07 DIAGNOSIS — C49.A2 GASTROINTESTINAL STROMAL TUMOR OF STOMACH: ICD-10-CM

## 2021-12-07 PROCEDURE — 99024 POSTOP FOLLOW-UP VISIT: CPT

## 2021-12-21 ENCOUNTER — APPOINTMENT (OUTPATIENT)
Dept: PLASTIC SURGERY | Facility: CLINIC | Age: 71
End: 2021-12-21
Payer: MEDICARE

## 2021-12-21 VITALS
HEART RATE: 68 BPM | HEIGHT: 69 IN | TEMPERATURE: 97.4 F | WEIGHT: 187 LBS | BODY MASS INDEX: 27.7 KG/M2 | SYSTOLIC BLOOD PRESSURE: 143 MMHG | OXYGEN SATURATION: 97 % | DIASTOLIC BLOOD PRESSURE: 73 MMHG

## 2021-12-21 DIAGNOSIS — S30.1XXS CONTUSION OF ABDOMINAL WALL, SEQUELA: ICD-10-CM

## 2021-12-21 DIAGNOSIS — Z98.890 OTHER SPECIFIED POSTPROCEDURAL STATES: ICD-10-CM

## 2021-12-21 PROCEDURE — 99024 POSTOP FOLLOW-UP VISIT: CPT

## 2022-01-03 PROBLEM — Z98.890 POST-OPERATIVE STATE: Status: ACTIVE | Noted: 2021-08-11

## 2022-01-03 PROBLEM — S30.1XXS ABDOMINAL WALL SEROMA, SEQUELA: Status: ACTIVE | Noted: 2021-11-22

## 2022-07-28 ENCOUNTER — APPOINTMENT (OUTPATIENT)
Dept: ORTHOPEDIC SURGERY | Facility: CLINIC | Age: 72
End: 2022-07-28

## 2022-07-28 VITALS — WEIGHT: 185 LBS | BODY MASS INDEX: 27.4 KG/M2 | HEIGHT: 69 IN

## 2022-07-28 VITALS — HEIGHT: 69 IN | WEIGHT: 185 LBS | BODY MASS INDEX: 27.4 KG/M2

## 2022-07-28 PROCEDURE — 99213 OFFICE O/P EST LOW 20 MIN: CPT

## 2022-07-28 NOTE — PHYSICAL EXAM
[Normal Coordination] : normal coordination [Orientated] : orientated [Able to Communicate] : able to communicate [Normal Skin] : normal skin [No obvious lymphadenopathy in areas examined] : no obvious lymphadenopathy in areas examined [Well Developed] : well developed [Well Nourished] : well nourished [Right] : right knee [NL (140)] : flexion 140 degrees [NL (0)] : extension 0 degrees [] : patient ambulates with assistive device

## 2022-07-28 NOTE — HISTORY OF PRESENT ILLNESS
[Sudden] : sudden [9] : 9 [0] : 0 [Localized] : localized [Sharp] : sharp [Shooting] : shooting [Intermittent] : intermittent [Leisure] : leisure [Walking] : walking [Exercising] : exercising [de-identified] : Last note - 7/23/21: PT present for 3rd Synvisc for the right knee.\par \par 7/15/21: PT present for 2nd Synvisc for the right knee.\par 7/8/21: PT present to start gel injections for the right knee.\par 6/10/21: PT present to review mri of the right knee.\par 6/03/2021: 72YO male patient present for right leg and upper tib/fib pain. Denies trauma. Right knee after pain playing\par basketball about 8 months ago, gradually worsening.\par  [] : no [FreeTextEntry1] : right knee [FreeTextEntry5] : Patient is here today for follow up on right knee pain. Pt states after riding a bike 2 days ago he has been feeling sharp pain in his knee. Pt states he received injection 6 months ago which helped with his pain.  [de-identified] : INJ

## 2022-07-28 NOTE — DISCUSSION/SUMMARY
[de-identified] : The documentation recorded by the scribe accurately reflects the service I personally performed and the decisions made by me.\par I, Erick Ortiz, attest that this documentation has been prepared under the direction and in the presence of Provider Moiz Whitten MD.\par \par The patient was seen by Moiz Whitten MD\par

## 2022-07-28 NOTE — ASSESSMENT
[FreeTextEntry1] : 6/3/21: Xray of the right knee reveals mild global arthritic changes.\par \par Obtain MRI of the right knee to r/o stress fx, place marker\par \par Impression: right knee\par 1. Complex diffuse medial meniscal tearing and surrounding synovitis with significant medial compartment arthrosis.\par 2. Chondral loss and slight subchondral edema in the inferior lateral patella with mild patellofemoral effusion and\par synovitis, small popliteal cyst and chronic ligament sprains with mild extensor mechanism tendinopathy.\par 3. No acute fracture suspected.\par 4. No lateral meniscal tear.\par Date of Dictation 06/06/2021/Electronically signed by Hugo Parkinson MD 06/07/2021\par I reviewed and agree with the results. \par \par Advised of subchondral edema in the medial tibial plateau\par Discussed options, Obtain auth for gel injections for the right knee.\par 3rd Synvisc for the right knee preformed today. Pt tolerated well. post inj instructions.\par \par Returning for gradually worsening knee pain\par Obtain authorization for visco injections.\par Interested in repeating visco injection right knee synvisc. Previous injections  reduced pain, and increased function in walking and stairs.\par questions answered.

## 2022-08-25 ENCOUNTER — APPOINTMENT (OUTPATIENT)
Dept: ORTHOPEDIC SURGERY | Facility: CLINIC | Age: 72
End: 2022-08-25

## 2022-10-27 ENCOUNTER — APPOINTMENT (OUTPATIENT)
Dept: ORTHOPEDIC SURGERY | Facility: CLINIC | Age: 72
End: 2022-10-27

## 2022-10-27 VITALS — HEIGHT: 69 IN | BODY MASS INDEX: 27.4 KG/M2 | WEIGHT: 185 LBS

## 2022-10-27 DIAGNOSIS — Z78.9 OTHER SPECIFIED HEALTH STATUS: ICD-10-CM

## 2022-10-27 PROCEDURE — ZZZZZ: CPT

## 2022-10-27 NOTE — REASON FOR VISIT
[FreeTextEntry2] : Patient is here today for follow up on right knee, will start Synvisc. Has been out and increased activities with the knee bothering him in the last 2 weeks.

## 2022-10-27 NOTE — PROCEDURE
[Right] : of the right [Knee] : knee [Pain] : pain [Inflammation] : inflammation [X-ray evidence of Osteoarthritis on this or prior visit] : x-ray evidence of Osteoarthritis on this or prior visit [Repeat series performed] : repeat series performed [Synvisc] : Synvisc [#1] : series #1 [Large Joint Injection] : Large joint injection [] : Patient tolerated procedure well [Call if redness, pain or fever occur] : call if redness, pain or fever occur [Apply ice for 15min out of every hour for the next 12-24 hours as tolerated] : apply ice for 15 minutes out of every hour for the next 12-24 hours as tolerated [Patient was advised to rest the joint(s) for ____ days] : patient was advised to rest the joint(s) for [unfilled] days [Patient had decreased mobility in the joint] : patient had decreased mobility in the joint [Risks, benefits, alternatives discussed / Verbal consent obtained] : the risks benefits, and alternatives have been discussed, and verbal consent was obtained [de-identified] :  The site was prepped with alcohol, betadine, ethyl chloride sprayed topically and sterile technique used.

## 2022-10-27 NOTE — DISCUSSION/SUMMARY
[de-identified] : The documentation recorded by the scribe accurately reflects the service I personally performed and the decisions made by me.\par I, Erick Ortiz, attest that this documentation has been prepared under the direction and in the presence of Provider Moiz Whitten MD.\par \par The patient was seen by Moiz Whitten MD\par \par The risks, benefits, and alternatives to Viscosupplementation injection were explained in full to the patient. Risks outlined include but are not limited to infection, sepsis, bleeding, scarring, skin discoloration, temporary increase in pain, syncopal episode, failure to resolve symptoms, allergic reaction, and symptom recurrence. Signs and symptoms of infection reviewed and patient advised to call immediately for redness, fevers, and/or chills. Patient understood the risks. All questions were answered. After discussion of options, patient requested Viscosupplementation. Oral informed consent was obtained and sterile prep was done of the injection site. Sterile technique was used without complications. The patient tolerated the procedure well. Ice tonight to the injection site.\par \par

## 2022-10-27 NOTE — ASSESSMENT
[FreeTextEntry1] : 6/3/21: Xray of the right knee reveals mild global arthritic changes.\par \par Obtain MRI of the right knee to r/o stress fx, place marker\par \par Impression: right knee\par 1. Complex diffuse medial meniscal tearing and surrounding synovitis with significant medial compartment arthrosis.\par 2. Chondral loss and slight subchondral edema in the inferior lateral patella with mild patellofemoral effusion and\par synovitis, small popliteal cyst and chronic ligament sprains with mild extensor mechanism tendinopathy.\par 3. No acute fracture suspected.\par 4. No lateral meniscal tear.\par Date of Dictation 06/06/2021/Electronically signed by Hugo Parkinson MD 06/07/2021\par I reviewed and agree with the results. \par \par Advised of subchondral edema in the medial tibial plateau\par Discussed options, Obtain auth for gel injections for the right knee.\par 3rd Synvisc for the right knee preformed today. Pt tolerated well. post inj instructions.\par \par Returning for gradually worsening knee pain\par Interested in repeating visco injection right knee synvisc. Previous injections  reduced pain, and increased function in walking and stairs.\par \par 10/27/2022: 1st right knee synvisc \par Apply ice to affected area.\par Increased pain last 2 wks. \par Activity modifier as tolerated.\par Questions addressed.

## 2022-10-27 NOTE — HISTORY OF PRESENT ILLNESS
[Sudden] : sudden [9] : 9 [0] : 0 [Localized] : localized [Sharp] : sharp [Shooting] : shooting [Intermittent] : intermittent [Leisure] : leisure [Walking] : walking [Exercising] : exercising [de-identified] : Last note - 7/23/21: PT present for 3rd Synvisc for the right knee.\par \par 7/15/21: PT present for 2nd Synvisc for the right knee.\par 7/8/21: PT present to start gel injections for the right knee.\par 6/10/21: PT present to review mri of the right knee.\par 6/03/2021: 70YO male patient present for right leg and upper tib/fib pain. Denies trauma. Right knee after pain playing\par basketball about 8 months ago, gradually worsening.\par  [] : no [FreeTextEntry1] : right knee [FreeTextEntry5] : Patient is here today for follow up on right knee pain. Pt states after riding a bike 2 days ago he has been feeling sharp pain in his knee. Pt states he received injection 6 months ago which helped with his pain.  [de-identified] : INJ

## 2022-11-03 ENCOUNTER — APPOINTMENT (OUTPATIENT)
Dept: ORTHOPEDIC SURGERY | Facility: CLINIC | Age: 72
End: 2022-11-03

## 2022-11-03 PROCEDURE — 20610 DRAIN/INJ JOINT/BURSA W/O US: CPT

## 2022-11-03 PROCEDURE — 99213 OFFICE O/P EST LOW 20 MIN: CPT | Mod: 25

## 2022-11-03 NOTE — DISCUSSION/SUMMARY
[de-identified] : The documentation recorded by the scribe accurately reflects the service I personally performed and the decisions made by me.\par I, Erick Ortiz, attest that this documentation has been prepared under the direction and in the presence of Provider Moiz Whitten MD.\par \par \par The risks, benefits, and alternatives to Viscosupplementation injection were explained in full to the patient. Risks outlined include but are not limited to infection, sepsis, bleeding, scarring, skin discoloration, temporary increase in pain, syncopal episode, failure to resolve symptoms, allergic reaction, and symptom recurrence. Signs and symptoms of infection reviewed and patient advised to call immediately for redness, fevers, and/or chills. Patient understood the risks. All questions were answered. After discussion of options, patient requested Viscosupplementation. Oral informed consent was obtained and sterile prep was done of the injection site. Sterile technique was used without complications. The patient tolerated the procedure well. Ice tonight to the injection site.\par \par

## 2022-11-03 NOTE — PHYSICAL EXAM
[Right] : right knee [NL (140)] : flexion 140 degrees [NL (0)] : extension 0 degrees [] : patient ambulates with assistive device

## 2022-11-03 NOTE — ASSESSMENT
[FreeTextEntry1] : 6/3/21: Xray of the right knee reveals mild global arthritic changes.\par \par Obtain MRI of the right knee to r/o stress fx, place marker\par \par Impression: right knee\par 1. Complex diffuse medial meniscal tearing and surrounding synovitis with significant medial compartment arthrosis.\par 2. Chondral loss and slight subchondral edema in the inferior lateral patella with mild patellofemoral effusion and\par synovitis, small popliteal cyst and chronic ligament sprains with mild extensor mechanism tendinopathy.\par 3. No acute fracture suspected.\par 4. No lateral meniscal tear.\par Date of Dictation 06/06/2021/Electronically signed by Hugo Parkinson MD 06/07/2021\par I reviewed and agree with the results. \par \par Advised of subchondral edema in the medial tibial plateau\par Discussed options, Obtain auth for gel injections for the right knee.\par 3rd Synvisc for the right knee preformed today. Pt tolerated well. post inj instructions.\par \par Returning for gradually worsening knee pain\par Interested in repeating visco injection right knee synvisc. Previous injections  reduced pain, and increased function in walking and stairs.\par \par 10/27/2022: 1st right knee synvisc \par Apply ice to affected area.\par Increased pain last 2 wks. \par \par 11/03/2022: 2nd right knee synvisc\par Activity modifier as tolerated.\par Questions addressed.

## 2022-11-03 NOTE — HISTORY OF PRESENT ILLNESS
[2] : 2 [Synvisc] : Synvisc [] : no [de-identified] : Last note - 7/23/21: PT present for 3rd Synvisc for the right knee.\par \par 7/15/21: PT present for 2nd Synvisc for the right knee.\par 7/8/21: PT present to start gel injections for the right knee.\par 6/10/21: PT present to review mri of the right knee.\par 6/03/2021: 72YO male patient present for right leg and upper tib/fib pain. Denies trauma. Right knee after pain playing\par basketball about 8 months ago, gradually worsening.\par  [FreeTextEntry1] : RIGHT KNEE [de-identified] : 10/27/22 [de-identified] : RIGHT KNEE

## 2022-11-03 NOTE — PROCEDURE
[Large Joint Injection] : Large joint injection [Right] : of the right [Knee] : knee [Pain] : pain [Inflammation] : inflammation [X-ray evidence of Osteoarthritis on this or prior visit] : x-ray evidence of Osteoarthritis on this or prior visit [Repeat series performed] : repeat series performed [Synvisc] : Synvisc [#2] : series #2 [] : Patient tolerated procedure well [Call if redness, pain or fever occur] : call if redness, pain or fever occur [Apply ice for 15min out of every hour for the next 12-24 hours as tolerated] : apply ice for 15 minutes out of every hour for the next 12-24 hours as tolerated [Patient was advised to rest the joint(s) for ____ days] : patient was advised to rest the joint(s) for [unfilled] days [Patient had decreased mobility in the joint] : patient had decreased mobility in the joint [Risks, benefits, alternatives discussed / Verbal consent obtained] : the risks benefits, and alternatives have been discussed, and verbal consent was obtained [de-identified] :  The site was prepped with alcohol, betadine, ethyl chloride sprayed topically and sterile technique used.

## 2022-11-10 ENCOUNTER — APPOINTMENT (OUTPATIENT)
Dept: ORTHOPEDIC SURGERY | Facility: CLINIC | Age: 72
End: 2022-11-10

## 2022-11-10 VITALS — WEIGHT: 185 LBS | BODY MASS INDEX: 27.4 KG/M2 | HEIGHT: 69 IN

## 2022-11-10 PROCEDURE — ZZZZZ: CPT

## 2022-11-10 NOTE — HISTORY OF PRESENT ILLNESS
[Synvisc] : Synvisc [3] : 3 [de-identified] : 11/03/22: 2ND SYNVISC r KNEE. \par \par 01/27/22: follow up on right knee, will start Synvisc. Has been out and increased activities with the knee bothering him in the last 2 weeks.  \par \par 7/23/21: PT present for 3rd Synvisc for the right knee.\par 7/15/21: PT present for 2nd Synvisc for the right knee.\par 7/8/21: PT present to start gel injections for the right knee.\par 6/10/21: PT present to review mri of the right knee.\par 6/03/2021: 70YO male patient present for right leg and upper tib/fib pain. Denies trauma. Right knee after pain playing\par basketball about 8 months ago, gradually worsening.\par  [] : no [FreeTextEntry1] : RIGHT KNEE [de-identified] : 11/3/22 [de-identified] : RIGHT KNEE

## 2022-11-10 NOTE — DISCUSSION/SUMMARY
[de-identified] : The documentation recorded by the scribe accurately reflects the service I personally performed and the decisions made by me.\par I, Erick Ortiz, attest that this documentation has been prepared under the direction and in the presence of Provider Moiz Whitten MD.\par \par \par The risks, benefits, and alternatives to Viscosupplementation injection were explained in full to the patient. Risks outlined include but are not limited to infection, sepsis, bleeding, scarring, skin discoloration, temporary increase in pain, syncopal episode, failure to resolve symptoms, allergic reaction, and symptom recurrence. Signs and symptoms of infection reviewed and patient advised to call immediately for redness, fevers, and/or chills. Patient understood the risks. All questions were answered. After discussion of options, patient requested Viscosupplementation. Oral informed consent was obtained and sterile prep was done of the injection site. Sterile technique was used without complications. The patient tolerated the procedure well. Ice tonight to the injection site.\par \par

## 2022-11-10 NOTE — ASSESSMENT
[FreeTextEntry1] : 6/3/21: Xray of the right knee reveals mild global arthritic changes.\par \par Obtain MRI of the right knee to r/o stress fx, place marker\par \par Impression: right knee\par 1. Complex diffuse medial meniscal tearing and surrounding synovitis with significant medial compartment arthrosis.\par 2. Chondral loss and slight subchondral edema in the inferior lateral patella with mild patellofemoral effusion and\par synovitis, small popliteal cyst and chronic ligament sprains with mild extensor mechanism tendinopathy.\par 3. No acute fracture suspected.\par 4. No lateral meniscal tear.\par Date of Dictation 06/06/2021/Electronically signed by Hugo Parkinson MD 06/07/2021\par I reviewed and agree with the results. \par \par Advised of subchondral edema in the medial tibial plateau\par Discussed options, Obtain auth for gel injections for the right knee.\par 3rd Synvisc for the right knee preformed today. Pt tolerated well. post inj instructions.\par \par Returning for gradually worsening knee pain\par Interested in repeating visco injection right knee synvisc. Previous injections  reduced pain, and increased function in walking and stairs.\par \par 10/27/2022: 1st right knee synvisc \par \par 11/03/2022: 2nd right knee synvisc\par \par 11/10/2022: 3rd right knee synvisc\par Activity modifier as tolerated.\par Questions addressed.\par RTO 6 wks or prn

## 2022-11-10 NOTE — PROCEDURE
[Large Joint Injection] : Large joint injection [Right] : of the right [Knee] : knee [Pain] : pain [Inflammation] : inflammation [X-ray evidence of Osteoarthritis on this or prior visit] : x-ray evidence of Osteoarthritis on this or prior visit [Repeat series performed] : repeat series performed [Synvisc] : Synvisc [] : Patient tolerated procedure well [Call if redness, pain or fever occur] : call if redness, pain or fever occur [Apply ice for 15min out of every hour for the next 12-24 hours as tolerated] : apply ice for 15 minutes out of every hour for the next 12-24 hours as tolerated [Patient was advised to rest the joint(s) for ____ days] : patient was advised to rest the joint(s) for [unfilled] days [Patient had decreased mobility in the joint] : patient had decreased mobility in the joint [Risks, benefits, alternatives discussed / Verbal consent obtained] : the risks benefits, and alternatives have been discussed, and verbal consent was obtained [#3] : series #3 [de-identified] :  The site was prepped with alcohol, betadine, ethyl chloride sprayed topically and sterile technique used.

## 2022-12-08 NOTE — ASU PREOPERATIVE ASSESSMENT, ADULT ([facility] ONLY) - PERSONAL BELONGINGS
lock [facility] Pregnancy And Lactation Text: There is insufficient data to evaluate drug-associated risk for major birth defects, miscarriage, or other adverse maternal or fetal outcomes.  There is a pregnancy registry that monitors pregnancy outcomes in pregnant persons exposed to the medication during pregnancy.  It is unknown if this medication is excreted in breast milk.  Do not breastfeed during treatment and for about 4 weeks after the last dose.

## 2023-04-12 NOTE — PHYSICAL THERAPY INITIAL EVALUATION ADULT - DID THE PATIENT HAVE SURGERY?
Ex lap ventral hernia repair with mesh/yes [facility] Pregnancy And Lactation Text: This medication has not been assigned a Pregnancy Risk Category. It is unknown if the medication is excreted in breast milk.

## 2023-05-10 NOTE — HISTORY OF PRESENT ILLNESS
[de-identified] : pt seen and examined. pt is s/p repair of incisional hernia repair. pt is back to have his drains removed. the drain output decreased and was removed today. he will come back in six weeks for follow up visit. 
altered mental status

## 2023-10-16 NOTE — H&P PST ADULT - REASON FOR ADMISSION
Ирина Gatica is 33w6d pregnant.  She is on labetalol 200 mg BID.  Yesterday she had a headache that tylenol did help and some dizziness.  She took her BP an hour before taking her labetalol and it was 145/76.    She feels she is needing to take her meds more often or have an increase in her dosage.  She was with her 10 mo old niece on Friday.  Her niece is not being tested for measles.  Ирина Gatica is immunized, but still concerned.  Please advise.   "revision of abdominal scar"

## 2023-10-31 NOTE — H&P PST ADULT - REASON FOR ADMISSION
Confirmed that patient received an additional set of vital signs prior to discharge. I'm having my mesh removed from my hernia and replacement

## 2023-11-07 ENCOUNTER — APPOINTMENT (OUTPATIENT)
Dept: ORTHOPEDIC SURGERY | Facility: CLINIC | Age: 73
End: 2023-11-07
Payer: MEDICARE

## 2023-11-07 VITALS — BODY MASS INDEX: 27.4 KG/M2 | HEIGHT: 69 IN | WEIGHT: 185 LBS

## 2023-11-07 DIAGNOSIS — C80.1 MALIGNANT (PRIMARY) NEOPLASM, UNSPECIFIED: ICD-10-CM

## 2023-11-07 PROCEDURE — 20610 DRAIN/INJ JOINT/BURSA W/O US: CPT | Mod: RT

## 2023-11-07 PROCEDURE — 73564 X-RAY EXAM KNEE 4 OR MORE: CPT | Mod: RT

## 2023-11-30 ENCOUNTER — APPOINTMENT (OUTPATIENT)
Dept: ORTHOPEDIC SURGERY | Facility: CLINIC | Age: 73
End: 2023-11-30
Payer: MEDICARE

## 2023-11-30 PROCEDURE — ZZZZZ: CPT

## 2023-12-06 ENCOUNTER — APPOINTMENT (OUTPATIENT)
Dept: ORTHOPEDIC SURGERY | Facility: CLINIC | Age: 73
End: 2023-12-06
Payer: MEDICARE

## 2023-12-06 PROCEDURE — 20610 DRAIN/INJ JOINT/BURSA W/O US: CPT | Mod: RT

## 2023-12-06 RX ORDER — MELOXICAM 15 MG/1
15 TABLET ORAL
Qty: 30 | Refills: 1 | Status: COMPLETED | COMMUNITY
Start: 2023-12-06 | End: 2024-02-04

## 2023-12-13 ENCOUNTER — APPOINTMENT (OUTPATIENT)
Dept: ORTHOPEDIC SURGERY | Facility: CLINIC | Age: 73
End: 2023-12-13
Payer: MEDICARE

## 2023-12-13 PROCEDURE — 20610 DRAIN/INJ JOINT/BURSA W/O US: CPT | Mod: RT

## 2023-12-13 NOTE — ASSESSMENT
[FreeTextEntry1] : 6/3/21: Xray of the right knee reveals mild global arthritic changes. Obtain MRI of the right knee to r/o stress fx, place marker  Impression: right knee 1. Complex diffuse medial meniscal tearing and surrounding synovitis with significant medial compartment arthrosis. 2. Chondral loss and slight subchondral edema in the inferior lateral patella with mild patellofemoral effusion and synovitis, small popliteal cyst and chronic ligament sprains with mild extensor mechanism tendinopathy. 3. No acute fracture suspected. 4. No lateral meniscal tear. Date of Dictation 06/06/2021/Electronically signed by Hugo Parkinson MD 06/07/2021 I reviewed and agree with the results.  Advised of subchondral edema in the medial tibial plateau Discussed options, Obtain auth for gel injections for the right knee. 3rd Synvisc for the right knee preformed today. Pt tolerated well. post inj instructions.  Returning for gradually worsening knee pain Interested in repeating visco injection right knee synvisc. Previous injections reduced pain, and increased function in walking and stairs.  10/27/2022: 1st right knee synvisc  11/03/2022: 2nd right knee synvisc  11/10/2022: 3rd right knee synvisc Activity modifier as tolerated. Questions addressed. RTO 6 wks or prn.  11/07/2023: Treatment options reviewed. Right knee x-rays, 4 views, reveals 75% loss of medial joint space. He recently completed a course of chemo for cancer treatment.  The patient called his chemo doctor who confirmed he can receive cortisone injections.  Submitted auth to repeat Synvisc. Interested in repeating visco injection right knee. Previous injections reduced pain, and increased function in walking and stairs.  Discussed and Patient elected to proceed with steroid injection.  Activity modification as tolerated.  Apply ice to affected area.  Questions addressed.   11/30/2023: Right knee Synvisc #1 tolerated well. Ice if sore. RTO 1 week to continue.  12/06/2023: Right knee Synvisc #2 tolerated well. Ice if sore. Rx for Mobic. RTO 1 week to continue.  12/13/2023: Right knee Synvisc #3 tolerated well. Ice if sore. Follow up in 6 weeks if symptoms persist.

## 2023-12-13 NOTE — PHYSICAL EXAM
[Right] : right knee [NL (0)] : extension 0 degrees [] : no erythema [TWNoteComboBox7] : flexion 130 degrees

## 2023-12-13 NOTE — HISTORY OF PRESENT ILLNESS
[Result of repetitive motion] : result of repetitive motion [Dull/Aching] : dull/aching [Social interactions] : social interactions [Injection therapy] : injection therapy [Retired] : Work status: retired [3] : 3 [Synvisc] : Synvisc [de-identified] : 12/13/2023: Right knee Synvisc #3. Symptoms continue. Denies complication with prior injection.  12/06/2023: Right knee Synvisc #2. Symptoms continue. Denies complication with prior injection.  11/30/2023: Right knee follow up. He has had relief following CSI. He was approved for synvisc injections.   11/07/2023: Follow up. He felt relief from Synvisc until around a week ago when he had a return in pain after using the knee.   11/03/22: 2ND SYNVISC r KNEE.   01/27/22: follow up on right knee, will start Synvisc. Has been out and increased activities with the knee bothering him in the last 2 weeks.    7/23/21: PT present for 3rd Synvisc for the right knee. 7/15/21: PT present for 2nd Synvisc for the right knee. 7/8/21: PT present to start gel injections for the right knee. 6/10/21: PT present to review mri of the right knee. 6/03/2021: 70YO male patient present for right leg and upper tib/fib pain. Denies trauma. Right knee after pain playing basketball about 8 months ago, gradually worsening.  [] : no [FreeTextEntry1] : RIGHT KNEE [de-identified] : 12/6/23 [de-identified] : RIGHT KNEE

## 2023-12-13 NOTE — PROCEDURE
[FreeTextEntry3] : A Large joint injection was performed of the RIGHT knee. An injection of Synvisc, series #3 was used. The indication for this procedure was pain and x-ray evidence of Osteoarthritis on this or prior visit, and patient had decreased mobility in the joint. Risks, benefits and alternatives to procedure were discussed; Risks outlined include but are not limited to infection, sepsis, bleeding, scarring, skin discoloration, temporary increase in pain, syncopal episode, failure to resolve symptoms, allergic reaction, and symptom recurrence. All questions were answered to the patient's apparent satisfaction and informed consent obtained. The area of injection was prepared in a sterile fashion. Prior to injection a 'Time Out' was conducted in accordance with [facility] policy and the site and nature of procedure verified with the patient.  Procedure: The injection and aspiration was carried out utilizing sterile technique. The site was prepped with alcohol, betadine, ethyl chloride sprayed topically and sterile technique used.  ( X ) 2cc of Synvisc  Patient tolerated the procedure well and direct pressure was applied for hemostasis. The patient was reminded of potential post-injection risks including, but not limited to, delayed hypersensitivity reactions and/or infection. Ice tonight to the injection site.

## 2024-01-01 NOTE — H&P PST ADULT - VENOUS THROMBOEMBOLISM
"PEDIATRIC PHYSICAL THERAPY EVALUATION  Type of Visit: Evaluation    See electronic medical record for Abuse and Falls Screening details.    Subjective         Presenting condition or subjective complaint:  Patient arrives with caregiver for assessment of right plagiocephaly. Plagio not as present when born, now noticing it getting worse. Parents have tried to turn to the L, works \"for a while\" but often turns back to the R.   Caregiver reported concerns:        Date of onset: 23   Relevant medical history:     None    Prior therapy history for the same diagnosis, illness or injury: No  NA      Living Environment  Social support:    Will stay home for now with mom.   Others who live in the home: Mother; Father    Lives at home with both parents    Goals for therapy:  Help her look to the left, improve head shape    Developmental History Milestones: Very young still, no evidence or fear of delay        Pain assessment:  No evidence of discomfort     Objective   ADDITIONAL HISTORY:   Patient/Caregiver Involvement: Attentive to patient needs  Gestational Age: 38w3d  Corrected Age: 5 weeks  Pregnancy/Labor/Delivery Complications: Delivered via , no known medical issues  Feeding: Bottle, Nursing      MUSCLE TONE: WNL  Quality of Movement: Normal for age    RANGE OF MOTION:  UE: ROM WFL  Neck/Trunk: ROM WFL  LE: ROM WFL    STRENGTH:  UE Strength: No antigravity movements  Does not bear weight on UE  Does not bear weight on LE  LE Strength: No antigravity movements  Does not bear weight on UE  Does not bear weight on LE  Cervical/Trunk Strength:  Very minimal cervical strength due to young age, however appears age-appropriate. Good extensor strength noted in propped tummy time!    VISUAL ENGAGEMENT:  Visual Engagement: Appropriate for age    AUDITORY RESPONSE:  Auditory Response: Startles, moves, cries or reacts in any way to unexpected loud noises    MOTOR SKILLS: Motor skills are very limited due to patient " being 5 weeks old, emerging attempts at head lift in prone (can perform in propped prone, lifts up to 60-70 degrees inconsistently) and age typical lower extremity movements.  Patient does prefer to head to the right in all positions today unless interested in things on her left (voices and lights).    NEUROLOGICAL FUNCTION:  No overt concerns    BEHAVIOR DURING EVALUATION:  State/Level of Alertness: awake throughout  Handling Tolerance: Good    TORTICOLLIS EVALUATION  PRESENTATION/POSTURE: Supine presentation: R rotation preference and L tilt preference (both mild)    CRANIOFACIAL SHAPE: Plagiocephaly: Plagiocephaly (Cranial Vault Asymmetry): Left Lateral Eyebrow to Right Occiput Measurement: 118 mm  Plagiocephaly (Cranial Vault Asymmetry): Right Lateral Eyebrow to Left Occiput Measurement: 125 mm  Plagiocephaly (Cranial Vault Asymmetry): Referrals Made: No referral, will monitor  Facial Asymmetries: Right ear shearing anteriorly, Flattened right occiput    HIPS:  Hips WNL    Sternocleidomastoid Muscle Palpation: Left SCM palpation WNL  Right SCM palpation WNL    ROM:  Currently PROM is full in all directions    CERVICAL MUSCLE STRENGTH (MUSCLE FUNCTION SCALE)  Right Lateral Head Righting (score 0-5): 0: Head below horizontal line, Left Lateral Head Righting (score 0-5): 0: Head below horizontal line    Classification of Torticollis Severity Scale (grade 1 - 7): Grade 1 (early mild): infant presents between 0-6 months of age, only postural preference or muscle tightness of <15 degrees from full cervical rotation ROM    DEVELOPMENTAL ASSESSMENT: See motor skills section for details     Assessment & Plan   CLINICAL IMPRESSIONS  Medical Diagnosis: Plagiocephaly (Q67.3)    Treatment Diagnosis: R plagiocephaly     Impression/Assessment:   Patient is a 5 week old female who was referred for concerns regarding plagiocephaly.  Patient presents with right rotation preference and right plagiocephaly which impacts her ability  to develop gross motor skills symmetrically and places patient at risk of worsening plagiocephaly and need for cranial shaping device.      Clinical Decision Making (Complexity):  Clinical Presentation: Stable/Uncomplicated  Clinical Presentation Rationale: based on medical and personal factors listed in PT evaluation  Clinical Decision Making (Complexity): Low complexity    Plan of Care  Treatment Interventions:  Interventions: Therapeutic Activity, Therapeutic Exercise    Long Term Goals     PT Goal 1  Goal Identifier: ROM  Goal Description: Pt will demonstrate symmetrical AROM and PROM of cervical spine to demonstrate resolution of torticollis for symmetrical development of gross motor skills.  Target Date: 05/25/24  PT Goal 2  Goal Identifier: Midline  Goal Description: Pt will demonstrate midline head position in all developmentally appropriate positions to allow for symmetrical gross motor development and assist in resolution of plagiocephaly/torticollis to allow for more peer appropriate engagement with environment  Target Date: 06/24/24  PT Goal 3  Goal Identifier: strength  Goal Description: Pt will demonstrate 5/5 MFS bilaterally and equally to demonstrate improved cervical strength for rolling and sitting to allow for age appropriate and symmetrical gross motor skill development.  Target Date: 07/24/24  PT Goal 4  Goal Identifier: rolling  Goal Description: Pt will symmetrically roll from supine to prone over either shoulder to demonstrate functional resolution of torticollis with appropriate patterns to allow for more peer appropriate gross motor skill development.  Target Date: 07/24/24  PT Goal 5  Goal Identifier: HEP  Goal Description: Pt's family will be IND with medically appropriate HEP to maximize pt progress and symmetrical gross motor skill devleopment both during and after formal PT POC.  Target Date: 07/24/24        Frequency of Treatment: 2x/month  Duration of Treatment: 90  days    Recommended Referrals to Other Professionals:  No other referrals at this time, will assess for need for helmet as patient approaches 4 months old    Education Assessment:    Learner/Method: Family  Education Comments: Educated on results of evaluation, recommendations for plan of care, information regarding plagiocephaly and torticollis as well as helmeting, and recommendations for HEP.    Risks and benefits of evaluation/treatment have been explained.   Patient/Family/caregiver agrees with Plan of Care.     Evaluation Time:     PT Eval, Low Complexity Minutes (23385): 20     Signing Clinician: Nikhil Armendariz, PT          [facility]                                                                                   OUTPATIENT PHYSICAL THERAPY    PLAN OF TREATMENT FOR OUTPATIENT REHABILITATION   Patient's Last Name, First Name, BAUTISTAElizabeth Shah YOB: 2025   Provider's Name   [facility]   Medical Record No.  [id number]     Onset Date: 04/23/24  Start of Care Date:  2024     Medical Diagnosis:  Plagiocephaly (Q67.3)      PT Treatment Diagnosis:  R plagiocephaly Plan of Treatment  Frequency/Duration: 2x/month/ 90 days    Certification date from  4/25/24 to    7/24/24       See note for plan of treatment details and functional goals     Nikhil Armendariz, PT                         I CERTIFY THE NEED FOR THESE SERVICES FURNISHED UNDER        THIS PLAN OF TREATMENT AND WHILE UNDER MY CARE     (Physician attestation of this document indicates review and certification of the therapy plan).              Referring Provider:  Mariya Tapia    Initial Assessment  See Epic Evaluation-             no

## 2024-01-03 NOTE — ASU PREOPERATIVE ASSESSMENT, ADULT ([facility] ONLY) - LOCKER #
Post-injection instructions:    Your pain may not be gone immediately after the procedure--it usually takes the steroid 3-5 days to start working.   It may take several weeks for the medicine to reach its' full effect.   Pay attention to how much pain relief (what percentage compared to before the procedure) you get and for how long it lasts.   We will ask you for this at the follow up visit.     Activity: Avoid strenuous activity for 24 hours. After that return to your normal activity level.     Bandages: Remove tomorrow    Showering/Bathing: You may shower after bandage is removed     Follow up: With Dr. Spaulding over the phone in one week to discuss how you are doing  Call Summer to Schedule.   Summer's Phone:  [phone]    After hours, call the   ([phone]) and ask for the pain management answering service if you notice any of the below:     Call the doctor immediately: if you notice:    Excessive bleeding from procedure site (brisk bright red bleeding from the site or bleeding that soaks the bandages or does not stop)   Severe headache  Inability to walk, leg or arm weakness or numbness that is significantly worse after the procedure   Uncontrolled pain   Inability to control your bowels or bladder   Signs of infection: Fever above 101.5F, redness, swelling, pus or drainage from the site         22

## 2024-02-20 ENCOUNTER — APPOINTMENT (OUTPATIENT)
Dept: ORTHOPEDIC SURGERY | Facility: CLINIC | Age: 74
End: 2024-02-20
Payer: MEDICARE

## 2024-02-20 VITALS — WEIGHT: 185 LBS | HEIGHT: 69 IN | BODY MASS INDEX: 27.4 KG/M2

## 2024-02-20 DIAGNOSIS — M54.42 LUMBAGO WITH SCIATICA, LEFT SIDE: ICD-10-CM

## 2024-02-20 DIAGNOSIS — G89.29 LUMBAGO WITH SCIATICA, LEFT SIDE: ICD-10-CM

## 2024-02-20 DIAGNOSIS — S76.012A STRAIN OF MUSCLE, FASCIA AND TENDON OF LEFT HIP, INITIAL ENCOUNTER: ICD-10-CM

## 2024-02-20 DIAGNOSIS — M51.36 OTHER INTERVERTEBRAL DISC DEGENERATION, LUMBAR REGION: ICD-10-CM

## 2024-02-20 PROCEDURE — 99213 OFFICE O/P EST LOW 20 MIN: CPT | Mod: 25

## 2024-02-20 PROCEDURE — 99203 OFFICE O/P NEW LOW 30 MIN: CPT | Mod: 25

## 2024-02-20 PROCEDURE — 72170 X-RAY EXAM OF PELVIS: CPT

## 2024-02-20 PROCEDURE — 72100 X-RAY EXAM L-S SPINE 2/3 VWS: CPT

## 2024-02-20 NOTE — PHYSICAL EXAM
[Bending to left] : bending to left [Left] : left hip [] : no groin pain with resisted straight leg raise

## 2024-02-20 NOTE — IMAGING
[Disc space narrowing] : Disc space narrowing [AP] : anteroposterior [There are no fractures, subluxations or dislocations. No significant abnormalities are seen] : There are no fractures, subluxations or dislocations. No significant abnormalities are seen [FreeTextEntry1] : advanced ddd at L5/S1, multilevel moderate ddd, retrolisthesis L2/3

## 2024-02-20 NOTE — HISTORY OF PRESENT ILLNESS
[Sudden] : sudden [Rest] : rest [de-identified] : Pulled left groin few months ago lifting heavy furniture up stairs. [place] a pop at the time. He has remained active, hip flares up frequently. Uses Advil which helps. Also has left low back pain to hip area at times. Has PMH Lymphoma in remission [] : no [FreeTextEntry1] : left hip  [FreeTextEntry3] : 3 months  [FreeTextEntry5] : patient is feeling pain in the hip after feeling a pop in hip while moving furniture

## 2024-04-30 ENCOUNTER — APPOINTMENT (OUTPATIENT)
Dept: ORTHOPEDIC SURGERY | Facility: CLINIC | Age: 74
End: 2024-04-30
Payer: MEDICARE

## 2024-04-30 VITALS — HEIGHT: 69 IN | BODY MASS INDEX: 26.66 KG/M2 | WEIGHT: 180 LBS

## 2024-04-30 DIAGNOSIS — M17.11 UNILATERAL PRIMARY OSTEOARTHRITIS, RIGHT KNEE: ICD-10-CM

## 2024-04-30 PROCEDURE — ZZZZZ: CPT

## 2024-04-30 NOTE — ASSESSMENT
[FreeTextEntry1] : 04/30/2024: His pain has returned. Patient elected for CSI today.   6/3/21: Xray of the right knee reveals mild global arthritic changes. Obtain MRI of the right knee to r/o stress fx, place marker  Impression: right knee 1. Complex diffuse medial meniscal tearing and surrounding synovitis with significant medial compartment arthrosis. 2. Chondral loss and slight subchondral edema in the inferior lateral patella with mild patellofemoral effusion and synovitis, small popliteal cyst and chronic ligament sprains with mild extensor mechanism tendinopathy. 3. No acute fracture suspected. 4. No lateral meniscal tear. Date of Dictation 06/06/2021/Electronically signed by Hugo Parkinson MD 06/07/2021 I reviewed and agree with the results.  Advised of subchondral edema in the medial tibial plateau Discussed options, Obtain auth for gel injections for the right knee. 3rd Synvisc for the right knee preformed today. Pt tolerated well. post inj instructions.  Returning for gradually worsening knee pain Interested in repeating visco injection right knee synvisc. Previous injections reduced pain, and increased function in walking and stairs.  10/27/2022: 1st right knee synvisc  11/03/2022: 2nd right knee synvisc  11/10/2022: 3rd right knee synvisc Activity modifier as tolerated. Questions addressed. RTO 6 wks or prn.  11/07/2023: Treatment options reviewed. Right knee x-rays, 4 views, reveals 75% loss of medial joint space. He recently completed a course of chemo for cancer treatment.  The patient called his chemo doctor who confirmed he can receive cortisone injections.  Submitted auth to repeat Synvisc. Interested in repeating visco injection right knee. Previous injections reduced pain, and increased function in walking and stairs.  Discussed and Patient elected to proceed with steroid injection.  Activity modification as tolerated.  Apply ice to affected area.  Questions addressed.   11/30/2023: Right knee Synvisc #1 tolerated well. Ice if sore. RTO 1 week to continue.  12/06/2023: Right knee Synvisc #2 tolerated well. Ice if sore. Rx for Mobic. RTO 1 week to continue.  12/13/2023: Right knee Synvisc #3 tolerated well. Ice if sore. Follow up in 6 weeks if symptoms persist.   04/30/2024: Treatment options reviewed. Discussed CSI and patient elects to proceed. Right knee CSI tolerated well. Activity limitations discussed. Reviewed timing of injections. Return PRN. Questions answered.  Progress note completed by Viktoriya Yun PA-C under the direct supervision of Moiz Whitten MD.

## 2024-04-30 NOTE — PROCEDURE
[FreeTextEntry3] :  A Large joint injection was performed of the [RIGHT KNEE]. The indication for this procedure was pain and inflammation, and patient had decreased mobility in the joint. Risks, benefits and alternatives to a steroid injection procedure were discussed; Risks outlined include but are not limited to infection, sepsis, bleeding, scarring, skin discoloration, temporary increase in pain, syncopal episode, failure to resolve symptoms, allergic reaction, symptom recurrence, and elevation of blood sugar in diabetics.. All questions were answered to the patient's apparent satisfaction and informed consent obtained. Prior to injection a 'Time Out' was conducted in accordance with [facility] policy and the site and nature of procedure verified with the patient.    Procedure: The area of injection was prepared in a sterile fashion. The injection and aspiration was carried out utilizing sterile technique. The site was prepped with alcohol, betadine, ethyl chloride sprayed topically and sterile technique used.   ( X ) 1cc of Celestone(30mg/ml)  ( X ) 2cc of 1% Lidocaine   Patient tolerated the procedure well and direct pressure was applied for hemostasis. The patient was reminded of potential post-injection risks including, but not limited to, delayed hypersensitivity reactions and/or infection. Ice tonight to the injection site.

## 2024-04-30 NOTE — HISTORY OF PRESENT ILLNESS
[Result of repetitive motion] : result of repetitive motion [Dull/Aching] : dull/aching [Social interactions] : social interactions [Injection therapy] : injection therapy [Retired] : Work status: retired [Synvisc] : Synvisc [de-identified] : 04/30/2024: Follow up right knee. He had good relief with visco, but symptoms have returned. No new injury.  12/13/2023: Right knee Synvisc #3. Symptoms continue. Denies complication with prior injection.  12/06/2023: Right knee Synvisc #2. Symptoms continue. Denies complication with prior injection.  11/30/2023: Right knee follow up. He has had relief following CSI. He was approved for synvisc injections.   11/07/2023: Follow up. He felt relief from Synvisc until around a week ago when he had a return in pain after using the knee.   11/03/22: 2ND SYNVISC r KNEE.   01/27/22: follow up on right knee, will start Synvisc. Has been out and increased activities with the knee bothering him in the last 2 weeks.    7/23/21: PT present for 3rd Synvisc for the right knee. 7/15/21: PT present for 2nd Synvisc for the right knee. 7/8/21: PT present to start gel injections for the right knee. 6/10/21: PT present to review mri of the right knee. 6/03/2021: 72YO male patient present for right leg and upper tib/fib pain. Denies trauma. Right knee after pain playing basketball about 8 months ago, gradually worsening.  [] : Post Surgical Visit: no [FreeTextEntry1] : RIGHT KNEE [de-identified] : 12.13.23 [de-identified] : RIGHT KNEE

## 2024-07-10 ENCOUNTER — APPOINTMENT (OUTPATIENT)
Dept: ORTHOPEDIC SURGERY | Facility: CLINIC | Age: 74
End: 2024-07-10

## 2024-07-10 VITALS — WEIGHT: 175 LBS | BODY MASS INDEX: 25.92 KG/M2 | HEIGHT: 69 IN

## 2024-07-10 PROCEDURE — ZZZZZ: CPT

## 2024-07-17 ENCOUNTER — APPOINTMENT (OUTPATIENT)
Dept: ORTHOPEDIC SURGERY | Facility: CLINIC | Age: 74
End: 2024-07-17

## 2024-07-17 PROCEDURE — ZZZZZ: CPT

## 2024-07-24 ENCOUNTER — APPOINTMENT (OUTPATIENT)
Dept: ORTHOPEDIC SURGERY | Facility: CLINIC | Age: 74
End: 2024-07-24
Payer: MEDICARE

## 2024-07-24 PROCEDURE — 20610 DRAIN/INJ JOINT/BURSA W/O US: CPT | Mod: RT

## 2024-07-24 NOTE — ASSESSMENT
[FreeTextEntry1] : 04/30/2024: His pain has returned. Patient elected for CSI today.   6/3/21: Xray of the right knee reveals mild global arthritic changes. Obtain MRI of the right knee to r/o stress fx, place marker  Impression: right knee 1. Complex diffuse medial meniscal tearing and surrounding synovitis with significant medial compartment arthrosis. 2. Chondral loss and slight subchondral edema in the inferior lateral patella with mild patellofemoral effusion and synovitis, small popliteal cyst and chronic ligament sprains with mild extensor mechanism tendinopathy. 3. No acute fracture suspected. 4. No lateral meniscal tear. Date of Dictation 06/06/2021/Electronically signed by Hugo Parkinson MD 06/07/2021 I reviewed and agree with the results.  Advised of subchondral edema in the medial tibial plateau Discussed options, Obtain auth for gel injections for the right knee. 3rd Synvisc for the right knee preformed today. Pt tolerated well. post inj instructions.  Returning for gradually worsening knee pain Interested in repeating visco injection right knee synvisc. Previous injections reduced pain, and increased function in walking and stairs.  10/27/2022: 1st right knee synvisc  11/03/2022: 2nd right knee synvisc  11/10/2022: 3rd right knee synvisc Activity modifier as tolerated. Questions addressed. RTO 6 wks or prn.  11/07/2023: Treatment options reviewed. Right knee x-rays, 4 views, reveals 75% loss of medial joint space. He recently completed a course of chemo for cancer treatment.  The patient called his chemo doctor who confirmed he can receive cortisone injections.  Submitted auth to repeat Synvisc. Interested in repeating visco injection right knee. Previous injections reduced pain, and increased function in walking and stairs.  Discussed and Patient elected to proceed with steroid injection.  Activity modification as tolerated.  Apply ice to affected area.  Questions addressed.   11/30/2023: Right knee Synvisc #1 tolerated well. Ice if sore. RTO 1 week to continue.  12/06/2023: Right knee Synvisc #2 tolerated well. Ice if sore. Rx for Mobic. RTO 1 week to continue.  12/13/2023: Right knee Synvisc #3 tolerated well. Ice if sore. Follow up in 6 weeks if symptoms persist.   04/30/2024: Treatment options reviewed. Discussed CSI and patient elects to proceed. Right knee CSI tolerated well. Activity limitations discussed. Reviewed timing of injections. Return PRN. Questions answered.  07/10/2024: Treatment options reviewed. Too early for repeat CSI today. Discussed visco injections and patient would like to proceed. Will request right knee Synvisc injections. Return when approved. Questions answered.  07/17/2024: Synvisc #1- tolerated well.  Symptoms continue, denies complications with prior injections.  Ice if sore. RTO in 1 week to continue.  12/06/2023: Right knee Synvisc #2 tolerated well. Ice if sore. RTO 1 week to continue.  Progress note completed by Viktoriya Yun PA-C under the direct supervision of Moiz Whitten MD.

## 2024-07-24 NOTE — PROCEDURE
[FreeTextEntry3] :  Large joint injection was performed of the right knee. An injection of Synvisc  series #2 was used. The indication for this procedure was pain and x-ray evidence of Osteoarthritis on this or prior visit, and patient had decreased mobility in the joint. Risks, benefits and alternatives to procedure were discussed; Risks outlined include but are not limited to infection, sepsis, bleeding, scarring, skin discoloration, temporary increase in pain, syncopal episode, failure to resolve symptoms, allergic reaction, and symptom recurrence. All questions were answered to the patient's apparent satisfaction and informed consent obtained. The area of injection was prepared in a sterile fashion. Prior to injection a 'Time Out' was conducted in accordance with [facility] policy and the site and nature of procedure verified with the patient.   Procedure: The injection and aspiration was carried out utilizing sterile technique. The site was prepped with alcohol, betadine, ethyl chloride sprayed topically and sterile technique used.   (x ) 2cc of Euflexxa   Patient tolerated the procedure well and direct pressure was applied for hemostasis. The patient was reminded of potential post-injection risks including, but not limited to, delayed hypersensitivity reactions and/or infection. Ice tonight to the injection site.

## 2024-07-24 NOTE — HISTORY OF PRESENT ILLNESS
[Result of repetitive motion] : result of repetitive motion [7] : 7 [3] : 3 [Dull/Aching] : dull/aching [Sharp] : sharp [Social interactions] : social interactions [Injection therapy] : injection therapy [Walking] : walking [Stairs] : stairs [Retired] : Work status: retired [Synvisc] : Synvisc [de-identified] : 07/24/2024: Synvic #2.  Symptoms continue, denies complications with prior injections.  07/17/2024: Here to begin synvisc  series.  Symptoms continue, denies complications with prior injections.  07/10/2024: Follow up right knee. He had relief with CSI at last visit, but symptoms have returned.   04/30/2024: Follow up right knee. He had good relief with visco, but symptoms have returned. No new injury.  12/13/2023: Right knee Synvisc #3. Symptoms continue. Denies complication with prior injection.  12/06/2023: Right knee Synvisc #2. Symptoms continue. Denies complication with prior injection.  11/30/2023: Right knee follow up. He has had relief following CSI. He was approved for synvisc injections.   11/07/2023: Follow up. He felt relief from Synvisc until around a week ago when he had a return in pain after using the knee.   11/03/22: 2ND SYNVISC r KNEE.   01/27/22: follow up on right knee, will start Synvisc. Has been out and increased activities with the knee bothering him in the last 2 weeks.    7/23/21: PT present for 3rd Synvisc for the right knee. 7/15/21: PT present for 2nd Synvisc for the right knee. 7/8/21: PT present to start gel injections for the right knee. 6/10/21: PT present to review mri of the right knee. 6/03/2021: 70YO male patient present for right leg and upper tib/fib pain. Denies trauma. Right knee after pain playing basketball about 8 months ago, gradually worsening.  07/10/2024: Patient states that his Rt knee is still hurting a lot.  [] : Post Surgical Visit: no [FreeTextEntry1] : RIGHT KNEE [de-identified] : Patient gotten an injection his last visit. [de-identified] : 12.13.23 [de-identified] : RIGHT KNEE

## 2024-07-31 ENCOUNTER — APPOINTMENT (OUTPATIENT)
Dept: ORTHOPEDIC SURGERY | Facility: CLINIC | Age: 74
End: 2024-07-31
Payer: MEDICARE

## 2024-07-31 DIAGNOSIS — M17.11 UNILATERAL PRIMARY OSTEOARTHRITIS, RIGHT KNEE: ICD-10-CM

## 2024-07-31 PROCEDURE — 20610 DRAIN/INJ JOINT/BURSA W/O US: CPT | Mod: RT

## 2024-07-31 NOTE — ASSESSMENT
[FreeTextEntry1] : 04/30/2024: His pain has returned. Patient elected for CSI today.   6/3/21: Xray of the right knee reveals mild global arthritic changes. Obtain MRI of the right knee to r/o stress fx, place marker  Impression: right knee 1. Complex diffuse medial meniscal tearing and surrounding synovitis with significant medial compartment arthrosis. 2. Chondral loss and slight subchondral edema in the inferior lateral patella with mild patellofemoral effusion and synovitis, small popliteal cyst and chronic ligament sprains with mild extensor mechanism tendinopathy. 3. No acute fracture suspected. 4. No lateral meniscal tear. Date of Dictation 06/06/2021/Electronically signed by Hugo Parkinson MD 06/07/2021 I reviewed and agree with the results.  Advised of subchondral edema in the medial tibial plateau Discussed options, Obtain auth for gel injections for the right knee. 3rd Synvisc for the right knee preformed today. Pt tolerated well. post inj instructions.  Returning for gradually worsening knee pain Interested in repeating visco injection right knee synvisc. Previous injections reduced pain, and increased function in walking and stairs.  10/27/2022: 1st right knee synvisc  11/03/2022: 2nd right knee synvisc  11/10/2022: 3rd right knee synvisc Activity modifier as tolerated. Questions addressed. RTO 6 wks or prn.  11/07/2023: Treatment options reviewed. Right knee x-rays, 4 views, reveals 75% loss of medial joint space. He recently completed a course of chemo for cancer treatment.  The patient called his chemo doctor who confirmed he can receive cortisone injections.  Submitted auth to repeat Synvisc. Interested in repeating visco injection right knee. Previous injections reduced pain, and increased function in walking and stairs.  Discussed and Patient elected to proceed with steroid injection.  Activity modification as tolerated.  Apply ice to affected area.  Questions addressed.   11/30/2023: Right knee Synvisc #1 tolerated well. Ice if sore. RTO 1 week to continue.  12/06/2023: Right knee Synvisc #2 tolerated well. Ice if sore. Rx for Mobic. RTO 1 week to continue.  12/13/2023: Right knee Synvisc #3 tolerated well. Ice if sore. Follow up in 6 weeks if symptoms persist.   04/30/2024: Treatment options reviewed. Discussed CSI and patient elects to proceed. Right knee CSI tolerated well. Activity limitations discussed. Reviewed timing of injections. Return PRN. Questions answered.  07/10/2024: Treatment options reviewed. Too early for repeat CSI today. Discussed visco injections and patient would like to proceed. Will request right knee Synvisc injections. Return when approved. Questions answered.  07/17/2024: Synvisc #1- tolerated well.  Symptoms continue, denies complications with prior injections.  Ice if sore. RTO in 1 week to continue.  07/24/2024: Synvisc #2- tolerated well.  Symptoms continue, denies complications with prior injections.  Ice if sore. RTO in 1 week to continue.  07/31/2024: Synvisc #3- tolerated well. Ice if sore. RTO in 6 weeks if symptoms persist.    Progress note completed by Viktoriya Yun PA-C under the direct supervision of Moiz Whitten MD.

## 2024-07-31 NOTE — PROCEDURE
[FreeTextEntry3] :  Large joint injection was performed of the right knee. An injection of Synvisc  series #3 was used. The indication for this procedure was pain and x-ray evidence of Osteoarthritis on this or prior visit, and patient had decreased mobility in the joint. Risks, benefits and alternatives to procedure were discussed; Risks outlined include but are not limited to infection, sepsis, bleeding, scarring, skin discoloration, temporary increase in pain, syncopal episode, failure to resolve symptoms, allergic reaction, and symptom recurrence. All questions were answered to the patient's apparent satisfaction and informed consent obtained. The area of injection was prepared in a sterile fashion. Prior to injection a 'Time Out' was conducted in accordance with [facility] policy and the site and nature of procedure verified with the patient.   Procedure: The injection and aspiration was carried out utilizing sterile technique. The site was prepped with alcohol, betadine, ethyl chloride sprayed topically and sterile technique used.   (x ) 2cc of Euflexxa   Patient tolerated the procedure well and direct pressure was applied for hemostasis. The patient was reminded of potential post-injection risks including, but not limited to, delayed hypersensitivity reactions and/or infection. Ice tonight to the injection site.

## 2024-07-31 NOTE — HISTORY OF PRESENT ILLNESS
[Result of repetitive motion] : result of repetitive motion [7] : 7 [3] : 3 [Dull/Aching] : dull/aching [Sharp] : sharp [Social interactions] : social interactions [Injection therapy] : injection therapy [Walking] : walking [Stairs] : stairs [Retired] : Work status: retired [Synvisc] : Synvisc [de-identified] : 07/31/2024: Synvisc #3.  Symptoms continue, denies complications with prior injections.  07/24/2024: Synvisc #2.  Symptoms continue, denies complications with prior injections.  07/17/2024: Here to begin synvisc  series.  Symptoms continue, denies complications with prior injections.  07/10/2024: Follow up right knee. He had relief with CSI at last visit, but symptoms have returned.   04/30/2024: Follow up right knee. He had good relief with visco, but symptoms have returned. No new injury.  12/13/2023: Right knee Synvisc #3. Symptoms continue. Denies complication with prior injection.  12/06/2023: Right knee Synvisc #2. Symptoms continue. Denies complication with prior injection.  11/30/2023: Right knee follow up. He has had relief following CSI. He was approved for synvisc injections.   11/07/2023: Follow up. He felt relief from Synvisc until around a week ago when he had a return in pain after using the knee.   11/03/22: 2ND SYNVISC r KNEE.   01/27/22: follow up on right knee, will start Synvisc. Has been out and increased activities with the knee bothering him in the last 2 weeks.    7/23/21: PT present for 3rd Synvisc for the right knee. 7/15/21: PT present for 2nd Synvisc for the right knee. 7/8/21: PT present to start gel injections for the right knee. 6/10/21: PT present to review mri of the right knee. 6/03/2021: 70YO male patient present for right leg and upper tib/fib pain. Denies trauma. Right knee after pain playing basketball about 8 months ago, gradually worsening.  07/10/2024: Patient states that his Rt knee is still hurting a lot.  [] : Post Surgical Visit: no [FreeTextEntry1] : RIGHT KNEE [de-identified] : Patient gotten an injection his last visit. [de-identified] : 12.13.23 [de-identified] : RIGHT KNEE

## 2024-10-31 ENCOUNTER — APPOINTMENT (OUTPATIENT)
Dept: ORTHOPEDIC SURGERY | Facility: CLINIC | Age: 74
End: 2024-10-31
Payer: MEDICARE

## 2024-10-31 VITALS — BODY MASS INDEX: 25.92 KG/M2 | WEIGHT: 175 LBS | HEIGHT: 69 IN

## 2024-10-31 DIAGNOSIS — Z00.00 ENCOUNTER FOR GENERAL ADULT MEDICAL EXAMINATION W/OUT ABNORMAL FINDINGS: ICD-10-CM

## 2024-10-31 DIAGNOSIS — S61.210A LACERATION W/OUT FOREIGN BODY OF RIGHT INDEX FINGER W/OUT DAMAGE TO NAIL, INITIAL ENCOUNTER: ICD-10-CM

## 2024-10-31 PROCEDURE — 99213 OFFICE O/P EST LOW 20 MIN: CPT

## 2024-10-31 PROCEDURE — 73140 X-RAY EXAM OF FINGER(S): CPT | Mod: RT

## 2024-10-31 RX ORDER — CEPHALEXIN 500 MG/1
500 TABLET, FILM COATED ORAL 3 TIMES DAILY
Qty: 21 | Refills: 0 | Status: ACTIVE | COMMUNITY
Start: 2024-10-31 | End: 1900-01-01

## 2024-11-11 ENCOUNTER — APPOINTMENT (OUTPATIENT)
Dept: ORTHOPEDIC SURGERY | Facility: CLINIC | Age: 74
End: 2024-11-11

## 2024-11-18 NOTE — ASU PREOP CHECKLIST - TEMPERATURE IN CELSIUS (DEGREES C)
36.7 identification for the head of household. ** Does not pay deposits for utilities or rent.   Contact:  [address] [place] [id number] [phone]   Helpful Info:  Hours of Operation: 9:00 a.m.-12:00 p.m.; 1:00 p.m. - 4:00 p.m. Monday-Friday.               [facility]*  (Call [facility] in [place] if you need more resources.)    [employer] on Whe[employer]  They offer: Meal delivery for eligible individuals.  Contact: [phone]    [facility] [employer]  They offer: Fresh food boxes. $5 for SNAP/EBT persons, $15 for all others.  Contact: [email or web address] (must preorder from site).   Helpful Info: Pickup every other Wednesday 11am-6pm at [address] [place], [place] [address] [place] [address] [facility] Pant[facility]  They offer: Groceries/food.  Contact: [phone]; [address] [place] [address]  Helpful Info: Open Thursday 8am-12pm.    [facility]  They offer: Groceries/food.  Contact: [phone]; [address] [place] [address]  Helpful Info: Open 8am-5pm Monday-Thursdays, 8am-12pm Fridays.    [facility] [employer] [facility]  They offer: Groceries/food.  Contact: [phone]; [address] [place] [address]  Helpful Info: Must call for hours and availability.         [facility]  They offer: Groceries/food.  Contact: [phone]  Helpful Info: Call for hours and availability.     [facility]  They offer: Emergency food.  Contact: [phone]; [address] [place] [address]  Helpful Info: Hours are Monday, Wednesday, and Friday 9am-1pm.     [employer] [facility]  They offer: Groceries/food.  Contact: [phone]; [address] [place] [address]  Helpful Info: Call for hours and availability.     [facility]  They offer: Groceries/food.  Contact: [phone]; [address] [place] [address]  Helpful Info: Call for hours and

## 2025-07-23 ENCOUNTER — APPOINTMENT (OUTPATIENT)
Dept: ORTHOPEDIC SURGERY | Facility: CLINIC | Age: 75
End: 2025-07-23
Payer: MEDICARE

## 2025-07-23 VITALS — WEIGHT: 180 LBS | HEIGHT: 69 IN | BODY MASS INDEX: 26.66 KG/M2

## 2025-07-23 PROCEDURE — 99213 OFFICE O/P EST LOW 20 MIN: CPT

## 2025-07-30 ENCOUNTER — APPOINTMENT (OUTPATIENT)
Dept: ORTHOPEDIC SURGERY | Facility: CLINIC | Age: 75
End: 2025-07-30
Payer: MEDICARE

## 2025-07-30 VITALS — HEIGHT: 69 IN | BODY MASS INDEX: 26.66 KG/M2 | WEIGHT: 180 LBS

## 2025-07-30 DIAGNOSIS — M17.11 UNILATERAL PRIMARY OSTEOARTHRITIS, RIGHT KNEE: ICD-10-CM

## 2025-07-30 PROCEDURE — 20610 DRAIN/INJ JOINT/BURSA W/O US: CPT | Mod: RT
